# Patient Record
Sex: FEMALE | Race: WHITE | NOT HISPANIC OR LATINO | Employment: FULL TIME | URBAN - NONMETROPOLITAN AREA
[De-identification: names, ages, dates, MRNs, and addresses within clinical notes are randomized per-mention and may not be internally consistent; named-entity substitution may affect disease eponyms.]

---

## 2023-03-28 DIAGNOSIS — E87.6 HYPOKALEMIA: ICD-10-CM

## 2023-03-28 RX ORDER — POTASSIUM CHLORIDE 1500 MG/1
20 TABLET, EXTENDED RELEASE ORAL 2 TIMES DAILY
COMMUNITY
Start: 2022-02-11 | End: 2023-03-28 | Stop reason: SDUPTHER

## 2023-03-28 RX ORDER — POTASSIUM CHLORIDE 20 MEQ/1
20 TABLET, EXTENDED RELEASE ORAL 2 TIMES DAILY
Qty: 180 TABLET | Refills: 1 | Status: SHIPPED | OUTPATIENT
Start: 2023-03-28 | End: 2023-03-28 | Stop reason: SDUPTHER

## 2023-03-28 RX ORDER — POTASSIUM CHLORIDE 20 MEQ/1
20 TABLET, EXTENDED RELEASE ORAL 2 TIMES DAILY
Qty: 14 TABLET | Refills: 1 | Status: SHIPPED | OUTPATIENT
Start: 2023-03-28 | End: 2023-06-02 | Stop reason: SDUPTHER

## 2023-06-02 ENCOUNTER — OFFICE VISIT (OUTPATIENT)
Dept: PRIMARY CARE | Facility: CLINIC | Age: 43
End: 2023-06-02
Payer: COMMERCIAL

## 2023-06-02 VITALS
BODY MASS INDEX: 40.16 KG/M2 | DIASTOLIC BLOOD PRESSURE: 74 MMHG | WEIGHT: 265 LBS | HEIGHT: 68 IN | SYSTOLIC BLOOD PRESSURE: 136 MMHG | HEART RATE: 76 BPM

## 2023-06-02 DIAGNOSIS — R73.09 ELEVATED GLUCOSE: ICD-10-CM

## 2023-06-02 DIAGNOSIS — E78.49 OTHER HYPERLIPIDEMIA: ICD-10-CM

## 2023-06-02 DIAGNOSIS — E87.6 HYPOKALEMIA: ICD-10-CM

## 2023-06-02 DIAGNOSIS — E66.01 CLASS 3 SEVERE OBESITY DUE TO EXCESS CALORIES WITH SERIOUS COMORBIDITY AND BODY MASS INDEX (BMI) OF 40.0 TO 44.9 IN ADULT (MULTI): ICD-10-CM

## 2023-06-02 DIAGNOSIS — I10 PRIMARY HYPERTENSION: Primary | ICD-10-CM

## 2023-06-02 PROBLEM — G47.30 SLEEP APNEA: Status: ACTIVE | Noted: 2023-06-02

## 2023-06-02 PROBLEM — R21 MALAR RASH: Status: ACTIVE | Noted: 2023-06-02

## 2023-06-02 PROBLEM — I83.93 VARICOSE VEINS OF LEGS: Status: ACTIVE | Noted: 2023-06-02

## 2023-06-02 PROBLEM — G43.019 MIGRAINE WITHOUT AURA, INTRACTABLE: Status: ACTIVE | Noted: 2023-06-02

## 2023-06-02 PROCEDURE — 99214 OFFICE O/P EST MOD 30 MIN: CPT | Performed by: FAMILY MEDICINE

## 2023-06-02 PROCEDURE — 3008F BODY MASS INDEX DOCD: CPT | Performed by: FAMILY MEDICINE

## 2023-06-02 PROCEDURE — 1036F TOBACCO NON-USER: CPT | Performed by: FAMILY MEDICINE

## 2023-06-02 PROCEDURE — 3078F DIAST BP <80 MM HG: CPT | Performed by: FAMILY MEDICINE

## 2023-06-02 PROCEDURE — 3075F SYST BP GE 130 - 139MM HG: CPT | Performed by: FAMILY MEDICINE

## 2023-06-02 RX ORDER — POTASSIUM CHLORIDE 20 MEQ/1
20 TABLET, EXTENDED RELEASE ORAL DAILY
Qty: 14 TABLET | Refills: 1
Start: 2023-06-02 | End: 2024-01-08 | Stop reason: ALTCHOICE

## 2023-06-02 RX ORDER — CHOLECALCIFEROL (VITAMIN D3) 25 MCG
TABLET ORAL
COMMUNITY

## 2023-06-02 RX ORDER — ATORVASTATIN CALCIUM 40 MG/1
1 TABLET, FILM COATED ORAL NIGHTLY
COMMUNITY
Start: 2021-12-02 | End: 2023-06-19

## 2023-06-02 RX ORDER — COLISTIN SULFATE, NEOMYCIN SULFATE, THONZONIUM BROMIDE AND HYDROCORTISONE ACETATE 3; 3.3; .5; 1 MG/ML; MG/ML; MG/ML; MG/ML
3 SUSPENSION AURICULAR (OTIC) 4 TIMES DAILY
COMMUNITY
End: 2023-06-30 | Stop reason: ALTCHOICE

## 2023-06-02 RX ORDER — LOSARTAN POTASSIUM 100 MG/1
1 TABLET ORAL DAILY
COMMUNITY
Start: 2019-11-04 | End: 2023-06-19

## 2023-06-02 RX ORDER — CHLORTHALIDONE 25 MG/1
0.5 TABLET ORAL DAILY
COMMUNITY
Start: 2021-05-04 | End: 2023-06-02

## 2023-06-02 NOTE — PROGRESS NOTES
Patient presents for periodic surveillance of chronic medical problems.     Subjective  Katharine Lewis is a 42 y.o. female who presents for 6 month follow up (Wants to discuss getting off the chlorthalidone due to her side effects with the sun. ).  HPI  Htn, stable,   BMI 40, has been getting regular exercise    Hypokalemia, on diuretic and potassium supplement  Hyperlipidemia, elevated glucose, updating labs soon.    Since starting the chlorthalidone shes had issues in the sun, gets sick nauseous and the timing correlates almost exactly.  We talked about stopping it, fup in a month with labs prior.  Reduce potassium from twice daily to once daily and possibly discontinue in future.        Review of Systems   All other systems reviewed and are negative.  .    Objective     Visit Vitals  /74 (BP Location: Left arm, Patient Position: Sitting)   Pulse 76      Physical Exam  Vitals reviewed.   Constitutional:       Appearance: Normal appearance.   HENT:      Head: Normocephalic and atraumatic.   Cardiovascular:      Rate and Rhythm: Normal rate and regular rhythm.   Pulmonary:      Effort: Pulmonary effort is normal.      Breath sounds: Normal breath sounds.   Neurological:      Mental Status: She is alert.         Assessment/Plan   Problem List Items Addressed This Visit          Circulatory    Hypertension - Primary    Relevant Orders    Follow Up In Primary Care       Other    Other hyperlipidemia    Elevated glucose    Hypokalemia    Relevant Medications    potassium chloride CR (Klor-Con M20) 20 mEq ER tablet     Other Visit Diagnoses       Class 3 severe obesity due to excess calories with serious comorbidity and body mass index (BMI) of 40.0 to 44.9 in adult (CMS/[facility])                       Anabelle Mcgrath MD

## 2023-06-17 DIAGNOSIS — I10 PRIMARY HYPERTENSION: ICD-10-CM

## 2023-06-17 DIAGNOSIS — E78.49 OTHER HYPERLIPIDEMIA: ICD-10-CM

## 2023-06-19 RX ORDER — ATORVASTATIN CALCIUM 40 MG/1
TABLET, FILM COATED ORAL
Qty: 90 TABLET | Refills: 3 | Status: SHIPPED | OUTPATIENT
Start: 2023-06-19 | End: 2024-05-10

## 2023-06-19 RX ORDER — LOSARTAN POTASSIUM 100 MG/1
TABLET ORAL
Qty: 90 TABLET | Refills: 3 | Status: SHIPPED | OUTPATIENT
Start: 2023-06-19 | End: 2024-05-10

## 2023-06-23 ENCOUNTER — TELEPHONE (OUTPATIENT)
Dept: PRIMARY CARE | Facility: CLINIC | Age: 43
End: 2023-06-23
Payer: COMMERCIAL

## 2023-06-23 DIAGNOSIS — R82.90 ABNORMAL URINALYSIS: Primary | ICD-10-CM

## 2023-06-23 LAB
ALANINE AMINOTRANSFERASE (SGPT) (U/L) IN SER/PLAS: 23 U/L (ref 7–45)
ALBUMIN (G/DL) IN SER/PLAS: 3.9 G/DL (ref 3.4–5)
ALBUMIN (MG/L) IN URINE: 12.5 MG/L
ALBUMIN/CREATININE (UG/MG) IN URINE: 13 UG/MG CRT (ref 0–30)
ALKALINE PHOSPHATASE (U/L) IN SER/PLAS: 72 U/L (ref 33–110)
ANION GAP IN SER/PLAS: 11 MMOL/L (ref 10–20)
APPEARANCE, URINE: ABNORMAL
ASPARTATE AMINOTRANSFERASE (SGOT) (U/L) IN SER/PLAS: 17 U/L (ref 9–39)
BACTERIA, URINE: ABNORMAL /HPF
BASOPHILS (10*3/UL) IN BLOOD BY AUTOMATED COUNT: 0.05 X10E9/L (ref 0–0.1)
BASOPHILS/100 LEUKOCYTES IN BLOOD BY AUTOMATED COUNT: 0.7 % (ref 0–2)
BILIRUBIN TOTAL (MG/DL) IN SER/PLAS: 0.6 MG/DL (ref 0–1.2)
BILIRUBIN, URINE: NEGATIVE
BLOOD, URINE: ABNORMAL
CALCIUM (MG/DL) IN SER/PLAS: 8.9 MG/DL (ref 8.6–10.3)
CARBON DIOXIDE, TOTAL (MMOL/L) IN SER/PLAS: 23 MMOL/L (ref 21–32)
CHLORIDE (MMOL/L) IN SER/PLAS: 109 MMOL/L (ref 98–107)
CHOLESTEROL (MG/DL) IN SER/PLAS: 109 MG/DL (ref 0–199)
CHOLESTEROL IN HDL (MG/DL) IN SER/PLAS: 41 MG/DL
CHOLESTEROL/HDL RATIO: 2.7
COLOR, URINE: YELLOW
CREATININE (MG/DL) IN SER/PLAS: 0.85 MG/DL (ref 0.5–1.05)
CREATININE (MG/DL) IN URINE: 95.9 MG/DL (ref 20–320)
EOSINOPHILS (10*3/UL) IN BLOOD BY AUTOMATED COUNT: 0.16 X10E9/L (ref 0–0.7)
EOSINOPHILS/100 LEUKOCYTES IN BLOOD BY AUTOMATED COUNT: 2.2 % (ref 0–6)
ERYTHROCYTE DISTRIBUTION WIDTH (RATIO) BY AUTOMATED COUNT: 13 % (ref 11.5–14.5)
ERYTHROCYTE MEAN CORPUSCULAR HEMOGLOBIN CONCENTRATION (G/DL) BY AUTOMATED: 31.1 G/DL (ref 32–36)
ERYTHROCYTE MEAN CORPUSCULAR VOLUME (FL) BY AUTOMATED COUNT: 87 FL (ref 80–100)
ERYTHROCYTES (10*6/UL) IN BLOOD BY AUTOMATED COUNT: 4.43 X10E12/L (ref 4–5.2)
ESTIMATED AVERAGE GLUCOSE FOR HBA1C: 97 MG/DL
GFR FEMALE: 88 ML/MIN/1.73M2
GLUCOSE (MG/DL) IN SER/PLAS: 96 MG/DL (ref 74–99)
GLUCOSE, URINE: NEGATIVE MG/DL
HEMATOCRIT (%) IN BLOOD BY AUTOMATED COUNT: 38.6 % (ref 36–46)
HEMOGLOBIN (G/DL) IN BLOOD: 12 G/DL (ref 12–16)
HEMOGLOBIN A1C/HEMOGLOBIN TOTAL IN BLOOD: 5 %
IMMATURE GRANULOCYTES/100 LEUKOCYTES IN BLOOD BY AUTOMATED COUNT: 0.3 % (ref 0–0.9)
KETONES, URINE: NEGATIVE MG/DL
LDL: 51 MG/DL (ref 0–99)
LEUKOCYTE ESTERASE, URINE: ABNORMAL
LEUKOCYTES (10*3/UL) IN BLOOD BY AUTOMATED COUNT: 7.2 X10E9/L (ref 4.4–11.3)
LYMPHOCYTES (10*3/UL) IN BLOOD BY AUTOMATED COUNT: 2.11 X10E9/L (ref 1.2–4.8)
LYMPHOCYTES/100 LEUKOCYTES IN BLOOD BY AUTOMATED COUNT: 29.4 % (ref 13–44)
MONOCYTES (10*3/UL) IN BLOOD BY AUTOMATED COUNT: 0.66 X10E9/L (ref 0.1–1)
MONOCYTES/100 LEUKOCYTES IN BLOOD BY AUTOMATED COUNT: 9.2 % (ref 2–10)
NEUTROPHILS (10*3/UL) IN BLOOD BY AUTOMATED COUNT: 4.17 X10E9/L (ref 1.2–7.7)
NEUTROPHILS/100 LEUKOCYTES IN BLOOD BY AUTOMATED COUNT: 58.2 % (ref 40–80)
NITRITE, URINE: NEGATIVE
PH, URINE: 7 (ref 5–8)
PLATELETS (10*3/UL) IN BLOOD AUTOMATED COUNT: 295 X10E9/L (ref 150–450)
POTASSIUM (MMOL/L) IN SER/PLAS: 3.8 MMOL/L (ref 3.5–5.3)
PROTEIN TOTAL: 6.8 G/DL (ref 6.4–8.2)
PROTEIN, URINE: NEGATIVE MG/DL
RBC, URINE: 75 /HPF (ref 0–5)
SODIUM (MMOL/L) IN SER/PLAS: 139 MMOL/L (ref 136–145)
SPECIFIC GRAVITY, URINE: 1.01 (ref 1–1.03)
SQUAMOUS EPITHELIAL CELLS, URINE: 4 /HPF
THYROTROPIN (MIU/L) IN SER/PLAS BY DETECTION LIMIT <= 0.05 MIU/L: 2.97 MIU/L (ref 0.44–3.98)
TRIGLYCERIDE (MG/DL) IN SER/PLAS: 85 MG/DL (ref 0–149)
UREA NITROGEN (MG/DL) IN SER/PLAS: 15 MG/DL (ref 6–23)
UROBILINOGEN, URINE: <2 MG/DL (ref 0–1.9)
VLDL: 17 MG/DL (ref 0–40)
WBC, URINE: 12 /HPF (ref 0–5)

## 2023-06-23 NOTE — TELEPHONE ENCOUNTER
----- Message from Anabelle Mcgrath MD sent at 6/23/2023  9:36 AM EDT -----  Can you please ask lab to add a culture to the urine she did today, I will put the order in.

## 2023-06-24 LAB — URINE CULTURE: NO GROWTH

## 2023-06-30 ENCOUNTER — OFFICE VISIT (OUTPATIENT)
Dept: PRIMARY CARE | Facility: CLINIC | Age: 43
End: 2023-06-30
Payer: COMMERCIAL

## 2023-06-30 VITALS
HEART RATE: 76 BPM | BODY MASS INDEX: 39.71 KG/M2 | DIASTOLIC BLOOD PRESSURE: 64 MMHG | WEIGHT: 262 LBS | SYSTOLIC BLOOD PRESSURE: 118 MMHG | HEIGHT: 68 IN

## 2023-06-30 DIAGNOSIS — R31.1 BENIGN ESSENTIAL MICROSCOPIC HEMATURIA: ICD-10-CM

## 2023-06-30 DIAGNOSIS — E66.01 CLASS 2 SEVERE OBESITY DUE TO EXCESS CALORIES WITH SERIOUS COMORBIDITY AND BODY MASS INDEX (BMI) OF 39.0 TO 39.9 IN ADULT (MULTI): ICD-10-CM

## 2023-06-30 DIAGNOSIS — R31.21 ASYMPTOMATIC MICROSCOPIC HEMATURIA: Primary | ICD-10-CM

## 2023-06-30 DIAGNOSIS — R73.09 ELEVATED GLUCOSE: ICD-10-CM

## 2023-06-30 DIAGNOSIS — I10 PRIMARY HYPERTENSION: ICD-10-CM

## 2023-06-30 PROCEDURE — 3008F BODY MASS INDEX DOCD: CPT | Performed by: FAMILY MEDICINE

## 2023-06-30 PROCEDURE — 3078F DIAST BP <80 MM HG: CPT | Performed by: FAMILY MEDICINE

## 2023-06-30 PROCEDURE — 99214 OFFICE O/P EST MOD 30 MIN: CPT | Performed by: FAMILY MEDICINE

## 2023-06-30 PROCEDURE — 1036F TOBACCO NON-USER: CPT | Performed by: FAMILY MEDICINE

## 2023-06-30 PROCEDURE — 3074F SYST BP LT 130 MM HG: CPT | Performed by: FAMILY MEDICINE

## 2023-06-30 NOTE — PROGRESS NOTES
"     Subjective  Katharine Lewis is a 42 y.o. female who presents for Follow-up.  HPI  Htn, well controlled.  She was having some side effects from the sun with chlorthalidone, and we stopped that and cut back her potassium until followup labs.  She states about eight days after stopping she did not feel well, did some \"googling\" and started taking her potassium every other day.  Reviewed labs, potassium is fine, recommend we dc it altogether and recheck it when off the medication.  BP looks great.  Lipids, on statin  Elevated glucose , history of ,normal now with normal A1c  Reviewed labs, note hematuria, no symptoms, negative culture states was no on/near menses.  Recommend we get a renal US and repeat ua in few weeks.       Review of Systems   All other systems reviewed and are negative.  .    Objective     Visit Vitals  /64 (BP Location: Left arm, Patient Position: Sitting)   Pulse 76      Physical Exam  Vitals and nursing note reviewed.   Constitutional:       General: She is not in acute distress.     Appearance: Normal appearance. She is not toxic-appearing.   HENT:      Head: Normocephalic and atraumatic.   Cardiovascular:      Rate and Rhythm: Normal rate and regular rhythm.      Heart sounds: No murmur heard.  Pulmonary:      Effort: Pulmonary effort is normal.      Breath sounds: Normal breath sounds.   Musculoskeletal:      Comments:     Skin:     General: Skin is warm and dry.   Neurological:      General: No focal deficit present.      Mental Status: She is alert and oriented to person, place, and time.   Psychiatric:         Mood and Affect: Mood normal.         Behavior: Behavior normal.         Assessment/Plan   Problem List Items Addressed This Visit       Elevated glucose    Relevant Orders    Hemoglobin A1C    Hypertension    Relevant Orders    Follow Up In Primary Care - Established    CBC and Auto Differential    Comprehensive Metabolic Panel    Hemoglobin A1C     Other Visit Diagnoses  "      Asymptomatic microscopic hematuria    -  Primary    Relevant Orders    US renal complete    Class 2 severe obesity due to excess calories with serious comorbidity and body mass index (BMI) of 39.0 to 39.9 in adult (CMS/[facility])                       Anabelle Mcgrath MD

## 2023-06-30 NOTE — PATIENT INSTRUCTIONS
Routine check up in six months, labs prior .  Stop potassium.   Recheck potassium level with urinalysis in next month at your convenience.  Will schedule an ultrasound of your kidneys and call you with results as well.

## 2023-07-24 ENCOUNTER — LAB (OUTPATIENT)
Dept: LAB | Facility: LAB | Age: 43
End: 2023-07-24
Payer: COMMERCIAL

## 2023-07-24 DIAGNOSIS — I10 PRIMARY HYPERTENSION: ICD-10-CM

## 2023-07-24 DIAGNOSIS — R31.1 BENIGN ESSENTIAL MICROSCOPIC HEMATURIA: ICD-10-CM

## 2023-07-24 LAB
BACTERIA, URINE: ABNORMAL /HPF
POTASSIUM (MMOL/L) IN SER/PLAS: 4 MMOL/L (ref 3.5–5.3)
RBC, URINE: 1 /HPF (ref 0–5)
SQUAMOUS EPITHELIAL CELLS, URINE: 1 /HPF
WBC, URINE: 3 /HPF (ref 0–5)

## 2023-07-24 PROCEDURE — 84132 ASSAY OF SERUM POTASSIUM: CPT

## 2023-07-24 PROCEDURE — 36415 COLL VENOUS BLD VENIPUNCTURE: CPT

## 2023-07-24 PROCEDURE — 81001 URINALYSIS AUTO W/SCOPE: CPT

## 2023-12-29 ENCOUNTER — APPOINTMENT (OUTPATIENT)
Dept: PRIMARY CARE | Facility: CLINIC | Age: 43
End: 2023-12-29
Payer: COMMERCIAL

## 2024-01-05 ENCOUNTER — LAB (OUTPATIENT)
Dept: LAB | Facility: LAB | Age: 44
End: 2024-01-05
Payer: COMMERCIAL

## 2024-01-05 DIAGNOSIS — I10 PRIMARY HYPERTENSION: ICD-10-CM

## 2024-01-05 DIAGNOSIS — R73.09 ELEVATED GLUCOSE: ICD-10-CM

## 2024-01-05 LAB
ALBUMIN SERPL BCP-MCNC: 3.8 G/DL (ref 3.4–5)
ALP SERPL-CCNC: 72 U/L (ref 33–110)
ALT SERPL W P-5'-P-CCNC: 19 U/L (ref 7–45)
ANION GAP SERPL CALC-SCNC: 10 MMOL/L (ref 10–20)
AST SERPL W P-5'-P-CCNC: 18 U/L (ref 9–39)
BASOPHILS # BLD AUTO: 0.04 X10*3/UL (ref 0–0.1)
BASOPHILS NFR BLD AUTO: 0.6 %
BILIRUB SERPL-MCNC: 0.7 MG/DL (ref 0–1.2)
BUN SERPL-MCNC: 18 MG/DL (ref 6–23)
CALCIUM SERPL-MCNC: 8.7 MG/DL (ref 8.6–10.3)
CHLORIDE SERPL-SCNC: 108 MMOL/L (ref 98–107)
CO2 SERPL-SCNC: 24 MMOL/L (ref 21–32)
CREAT SERPL-MCNC: 0.96 MG/DL (ref 0.5–1.05)
EOSINOPHIL # BLD AUTO: 0.14 X10*3/UL (ref 0–0.7)
EOSINOPHIL NFR BLD AUTO: 2.2 %
ERYTHROCYTE [DISTWIDTH] IN BLOOD BY AUTOMATED COUNT: 14.3 % (ref 11.5–14.5)
EST. AVERAGE GLUCOSE BLD GHB EST-MCNC: 94 MG/DL
GFR SERPL CREATININE-BSD FRML MDRD: 76 ML/MIN/1.73M*2
GLUCOSE SERPL-MCNC: 96 MG/DL (ref 74–99)
HBA1C MFR BLD: 4.9 %
HCT VFR BLD AUTO: 38.7 % (ref 36–46)
HGB BLD-MCNC: 12 G/DL (ref 12–16)
IMM GRANULOCYTES # BLD AUTO: 0.02 X10*3/UL (ref 0–0.7)
IMM GRANULOCYTES NFR BLD AUTO: 0.3 % (ref 0–0.9)
LYMPHOCYTES # BLD AUTO: 1.98 X10*3/UL (ref 1.2–4.8)
LYMPHOCYTES NFR BLD AUTO: 30.9 %
MCH RBC QN AUTO: 26.5 PG (ref 26–34)
MCHC RBC AUTO-ENTMCNC: 31 G/DL (ref 32–36)
MCV RBC AUTO: 86 FL (ref 80–100)
MONOCYTES # BLD AUTO: 0.57 X10*3/UL (ref 0.1–1)
MONOCYTES NFR BLD AUTO: 8.9 %
NEUTROPHILS # BLD AUTO: 3.65 X10*3/UL (ref 1.2–7.7)
NEUTROPHILS NFR BLD AUTO: 57.1 %
NRBC BLD-RTO: 0 /100 WBCS (ref 0–0)
PLATELET # BLD AUTO: 269 X10*3/UL (ref 150–450)
POTASSIUM SERPL-SCNC: 4 MMOL/L (ref 3.5–5.3)
PROT SERPL-MCNC: 6.7 G/DL (ref 6.4–8.2)
RBC # BLD AUTO: 4.52 X10*6/UL (ref 4–5.2)
SODIUM SERPL-SCNC: 138 MMOL/L (ref 136–145)
WBC # BLD AUTO: 6.4 X10*3/UL (ref 4.4–11.3)

## 2024-01-05 PROCEDURE — 85025 COMPLETE CBC W/AUTO DIFF WBC: CPT

## 2024-01-05 PROCEDURE — 80053 COMPREHEN METABOLIC PANEL: CPT

## 2024-01-05 PROCEDURE — 83036 HEMOGLOBIN GLYCOSYLATED A1C: CPT

## 2024-01-05 PROCEDURE — 36415 COLL VENOUS BLD VENIPUNCTURE: CPT

## 2024-01-08 ENCOUNTER — OFFICE VISIT (OUTPATIENT)
Dept: PRIMARY CARE | Facility: CLINIC | Age: 44
End: 2024-01-08
Payer: COMMERCIAL

## 2024-01-08 VITALS
SYSTOLIC BLOOD PRESSURE: 126 MMHG | HEART RATE: 70 BPM | WEIGHT: 254 LBS | BODY MASS INDEX: 38.62 KG/M2 | OXYGEN SATURATION: 99 % | DIASTOLIC BLOOD PRESSURE: 70 MMHG

## 2024-01-08 DIAGNOSIS — Z12.31 SCREENING MAMMOGRAM FOR BREAST CANCER: Primary | ICD-10-CM

## 2024-01-08 DIAGNOSIS — I10 PRIMARY HYPERTENSION: ICD-10-CM

## 2024-01-08 DIAGNOSIS — E78.49 OTHER HYPERLIPIDEMIA: ICD-10-CM

## 2024-01-08 DIAGNOSIS — Z00.00 HEALTH CARE MAINTENANCE: ICD-10-CM

## 2024-01-08 DIAGNOSIS — R73.09 ELEVATED GLUCOSE: ICD-10-CM

## 2024-01-08 PROBLEM — R21 MALAR RASH: Status: RESOLVED | Noted: 2023-06-02 | Resolved: 2024-01-08

## 2024-01-08 PROBLEM — E87.6 HYPOKALEMIA: Status: RESOLVED | Noted: 2023-06-02 | Resolved: 2024-01-08

## 2024-01-08 PROCEDURE — 99214 OFFICE O/P EST MOD 30 MIN: CPT | Performed by: FAMILY MEDICINE

## 2024-01-08 PROCEDURE — 3078F DIAST BP <80 MM HG: CPT | Performed by: FAMILY MEDICINE

## 2024-01-08 PROCEDURE — 3074F SYST BP LT 130 MM HG: CPT | Performed by: FAMILY MEDICINE

## 2024-01-08 PROCEDURE — 3008F BODY MASS INDEX DOCD: CPT | Performed by: FAMILY MEDICINE

## 2024-01-08 PROCEDURE — 1036F TOBACCO NON-USER: CPT | Performed by: FAMILY MEDICINE

## 2024-01-08 RX ORDER — AMOXICILLIN AND CLAVULANATE POTASSIUM 500; 125 MG/1; MG/1
500 TABLET, FILM COATED ORAL 2 TIMES DAILY
COMMUNITY
Start: 2024-01-04 | End: 2024-02-02 | Stop reason: ALTCHOICE

## 2024-01-08 ASSESSMENT — PATIENT HEALTH QUESTIONNAIRE - PHQ9
2. FEELING DOWN, DEPRESSED OR HOPELESS: NOT AT ALL
SUM OF ALL RESPONSES TO PHQ9 QUESTIONS 1 AND 2: 0
1. LITTLE INTEREST OR PLEASURE IN DOING THINGS: NOT AT ALL

## 2024-01-08 NOTE — PATIENT INSTRUCTIONS
Routine followup six months, labs prior.  GYN appt.  Consider 12 mos fup if all well in six months.  As weight comes down, may be able to cut back on medication, call concerns.

## 2024-01-08 NOTE — PROGRESS NOTES
Patient presents for periodic surveillance of chronic medical problems.     Subjective  Katharine Lewis is a 43 y.o. female who presents for Follow-up (6 MO FUV).  HPI  Htn,stable  Has been to gym regularly for last six weeks, has , eating well.  States feels great.  Had two episodes after chest pain felt like bp was high.  Hyperlipidemia on statin  Elevated glucose history, A1c normal at 4.9  Due for mammogram.  GYN care due.  States past year with menses has been passing large clots.    Review of Systems   All other systems reviewed and are negative.  .    Objective     Visit Vitals  /70   Pulse 70      Physical Exam  Vitals and nursing note reviewed.   Constitutional:       General: She is not in acute distress.     Appearance: Normal appearance. She is not toxic-appearing.   HENT:      Head: Normocephalic and atraumatic.   Cardiovascular:      Rate and Rhythm: Normal rate and regular rhythm.      Heart sounds: No murmur heard.  Pulmonary:      Effort: Pulmonary effort is normal.      Breath sounds: Normal breath sounds.   Musculoskeletal:      Cervical back: Neck supple. No rigidity.      Comments:     Skin:     General: Skin is warm and dry.   Neurological:      General: No focal deficit present.      Mental Status: She is alert and oriented to person, place, and time.   Psychiatric:         Mood and Affect: Mood normal.         Behavior: Behavior normal.         Assessment/Plan   Problem List Items Addressed This Visit       Other hyperlipidemia    Relevant Orders    Lipid Panel    TSH with reflex to Free T4 if abnormal    Elevated glucose    Hypertension    Relevant Orders    Comprehensive Metabolic Panel    Lipid Panel    TSH with reflex to Free T4 if abnormal     Other Visit Diagnoses       Screening mammogram for breast cancer    -  Primary    Relevant Orders    BI mammo bilateral screening tomosynthesis    Health care maintenance        Relevant Orders    Referral to Gynecology                    Anabelle Mcgrath MD

## 2024-01-15 ENCOUNTER — HOSPITAL ENCOUNTER (OUTPATIENT)
Dept: RADIOLOGY | Facility: HOSPITAL | Age: 44
Discharge: HOME | End: 2024-01-15
Payer: COMMERCIAL

## 2024-01-15 DIAGNOSIS — Z12.31 SCREENING MAMMOGRAM FOR BREAST CANCER: ICD-10-CM

## 2024-01-15 PROCEDURE — 77067 SCR MAMMO BI INCL CAD: CPT | Performed by: RADIOLOGY

## 2024-01-15 PROCEDURE — 77063 BREAST TOMOSYNTHESIS BI: CPT | Performed by: RADIOLOGY

## 2024-01-15 PROCEDURE — 77067 SCR MAMMO BI INCL CAD: CPT

## 2024-02-02 ENCOUNTER — OFFICE VISIT (OUTPATIENT)
Dept: OBSTETRICS AND GYNECOLOGY | Facility: CLINIC | Age: 44
End: 2024-02-02
Payer: COMMERCIAL

## 2024-02-02 VITALS
DIASTOLIC BLOOD PRESSURE: 72 MMHG | WEIGHT: 251 LBS | BODY MASS INDEX: 38.04 KG/M2 | HEIGHT: 68 IN | SYSTOLIC BLOOD PRESSURE: 124 MMHG

## 2024-02-02 DIAGNOSIS — Z01.419 ENCOUNTER FOR GYNECOLOGICAL EXAMINATION WITHOUT ABNORMAL FINDING: ICD-10-CM

## 2024-02-02 DIAGNOSIS — Z00.00 HEALTH CARE MAINTENANCE: ICD-10-CM

## 2024-02-02 DIAGNOSIS — Z12.4 ENCOUNTER FOR SCREENING FOR CERVICAL CANCER: ICD-10-CM

## 2024-02-02 DIAGNOSIS — N92.0 MENORRHAGIA WITH REGULAR CYCLE: Primary | ICD-10-CM

## 2024-02-02 PROCEDURE — 1036F TOBACCO NON-USER: CPT | Performed by: OBSTETRICS & GYNECOLOGY

## 2024-02-02 PROCEDURE — 3078F DIAST BP <80 MM HG: CPT | Performed by: OBSTETRICS & GYNECOLOGY

## 2024-02-02 PROCEDURE — 99386 PREV VISIT NEW AGE 40-64: CPT | Performed by: OBSTETRICS & GYNECOLOGY

## 2024-02-02 PROCEDURE — 3008F BODY MASS INDEX DOCD: CPT | Performed by: OBSTETRICS & GYNECOLOGY

## 2024-02-02 PROCEDURE — 3074F SYST BP LT 130 MM HG: CPT | Performed by: OBSTETRICS & GYNECOLOGY

## 2024-02-02 PROCEDURE — 88175 CYTOPATH C/V AUTO FLUID REDO: CPT

## 2024-02-02 RX ORDER — NORETHINDRONE ACETATE AND ETHINYL ESTRADIOL 1MG-20(21)
1 KIT ORAL DAILY
Qty: 28 TABLET | Refills: 12 | Status: SHIPPED | OUTPATIENT
Start: 2024-02-02 | End: 2024-03-25 | Stop reason: ALTCHOICE

## 2024-02-02 NOTE — PROGRESS NOTES
Katharine Lewis is a 43 y.o. female who is here for a routine exam. PCP = Anabelle Mcgrath MD    Chief Complaint   Patient presents with    New Patient Visit     New Patient is here for yearly exam and pap test. Patient does do regular self breast exams and c/o very heavy periods with very large clots. LMP:24.          Presents for annual exam. She voices no complaints and is doing well. Denies any bowel or bladder problems. Denies any breast problems.  She had previous tubal sterilization.  Over the last year she has noticed that her menstrual flows have become heavy with clots.  She has regular monthly cycles without intermenstrual bleeding.    OB History          2    Para   2    Term   2       0    AB   0    Living   2         SAB   0    IAB   0    Ectopic   0    Multiple   0    Live Births   2                  Social History     Substance and Sexual Activity   Sexual Activity Yes    Partners: Male    Birth control/protection: Female Sterilization     Current contraception: Tubal Sterilization    Past Medical History:   Diagnosis Date    Anemia, unspecified 2021    Mild anemia    Essential (primary) hypertension 2022    Hypertension    Migraine without aura, intractable, without status migrainosus 2021    Migraine without aura, intractable    Personal history of other endocrine, nutritional and metabolic disease 2021    History of vitamin D deficiency       Past Surgical History:   Procedure Laterality Date    TUBAL LIGATION         Past med hx and past surg hx reviewed and notable for: none    Review of Systems:   Constitutional: No fever or chills  Respiratory: No shortness of breath, or cough  Cardiovascular: No chest pain or syncope  Breasts: No breast pain, no masses, no nipple discharge  Gastrointestinal: No nausea, vomiting, or diarrhea, no abdominal pain  Genitourinary: No dysuria or frequency  Gynecology: Negative except as noted in history of present  "illness  All other: All other systems reviewed and negative for complaint    Objective   /72   Ht 1.727 m (5' 8\")   Wt 114 kg (251 lb)   LMP 01/04/2024   BMI 38.16 kg/m²     PHYSICAL EXAMINATION:  Well-developed, well nourished, in no acute distress, alert and oriented x three, is pleasant and cooperative.   HEENT: Clear. Pupils equal, round and reactive to light and accommodation. Extraocular muscles are intact. Oral mucosa pink without exudate.   NECK: No lymphadenopathy, no thyromegaly.  BREASTS: Symmetric, no palpable masses. No nipple discharge or retraction.  LUNGS: Clear bilaterally.  HEART: Regular rate and rhythm without murmurs.  ABDOMEN: Normoactive bowel sounds, soft and nontender, no guarding or rebound tenderness, no CVA tenderness.  EXTREMITIES: No clubbing, cyanosis or edema.  NEUROLOGIC:  Cranial nerves II-XII grossly intact.  :  Normal external female genitalia, normal vulva, normal vagina. Normal urethral meatus, urethra and bladder. Normal appearing cervix. Normal-sized uterus, no adnexal masses or tenderness. Pap smear performed today.      Actions performed during this visit include:  - Clinical breast exam  - Clinical pelvic exam  - No orders of the defined types were placed in this encounter.       Problem List Items Addressed This Visit    None  Visit Diagnoses       Menorrhagia with regular cycle    -  Primary    Relevant Medications    norethindrone-e.estradioL-iron (Loestrin Fe 1/20, 28-Day,) 1 mg-20 mcg (21)/75 mg (7) tablet    Health care maintenance        Relevant Orders    THINPREP PAP TEST    Encounter for gynecological examination without abnormal finding        Relevant Orders    THINPREP PAP TEST    Encounter for screening for cervical cancer                 Provider Impression:  1.  Annual  2.  Menorrhagia  Patient had recent mammogram performed.  Regarding her heavy menstrual flows she was informed of the options of birth control pills, NuvaRing, IUD or endometrial " ablation.  Patient wishes to try birth control pills.      Thank you for coming to your annual exam. Your findings during the exam were normal.  Please return for your next visit in 4 months to assess how she is doing on the pills..

## 2024-02-16 LAB
CYTOLOGY CMNT CVX/VAG CYTO-IMP: NORMAL
LAB AP HPV GENOTYPE QUESTION: YES
LAB AP HPV HR: NORMAL
LABORATORY COMMENT REPORT: NORMAL
LMP START DATE: NORMAL
PATH REPORT.TOTAL CANCER: NORMAL

## 2024-03-25 ENCOUNTER — OFFICE VISIT (OUTPATIENT)
Dept: PRIMARY CARE | Facility: CLINIC | Age: 44
End: 2024-03-25
Payer: COMMERCIAL

## 2024-03-25 VITALS
HEIGHT: 68 IN | DIASTOLIC BLOOD PRESSURE: 86 MMHG | BODY MASS INDEX: 37.59 KG/M2 | SYSTOLIC BLOOD PRESSURE: 144 MMHG | WEIGHT: 248 LBS | HEART RATE: 68 BPM

## 2024-03-25 DIAGNOSIS — R09.81 NASAL CONGESTION: Primary | ICD-10-CM

## 2024-03-25 PROCEDURE — 3077F SYST BP >= 140 MM HG: CPT | Performed by: FAMILY MEDICINE

## 2024-03-25 PROCEDURE — 3008F BODY MASS INDEX DOCD: CPT | Performed by: FAMILY MEDICINE

## 2024-03-25 PROCEDURE — 99213 OFFICE O/P EST LOW 20 MIN: CPT | Performed by: FAMILY MEDICINE

## 2024-03-25 PROCEDURE — 3079F DIAST BP 80-89 MM HG: CPT | Performed by: FAMILY MEDICINE

## 2024-03-25 PROCEDURE — 1036F TOBACCO NON-USER: CPT | Performed by: FAMILY MEDICINE

## 2024-03-25 RX ORDER — AMOXICILLIN 875 MG/1
875 TABLET, FILM COATED ORAL 2 TIMES DAILY
Qty: 20 TABLET | Refills: 0 | Status: SHIPPED | OUTPATIENT
Start: 2024-03-25 | End: 2024-04-04

## 2024-03-25 RX ORDER — MONTELUKAST SODIUM 10 MG/1
10 TABLET ORAL NIGHTLY
Qty: 30 TABLET | Refills: 5 | Status: SHIPPED | OUTPATIENT
Start: 2024-03-25 | End: 2024-09-21

## 2024-03-25 RX ORDER — LORATADINE 10 MG/1
10 TABLET ORAL DAILY
COMMUNITY

## 2024-03-25 NOTE — PATIENT INSTRUCTIONS
Likely allergic, recommend regular flonase and claritin daily, add singulair, if not helping try antibiotic, call concerns.

## 2024-03-25 NOTE — PROGRESS NOTES
Subjective  Katharine Lewis is a 43 y.o. female who presents for Head congestion.  HPI  Taking claritin, having nasal congestion, postnasal drainage, throat itches.  Cough occasionally.  Ears feel full.    NO sneezing, eyes don't itch or water.  No facial pain, no fevers/chills.    ON flonase, not using regularly.  When using it it helps, short term.  Today feels ill,achy.  Review of Systems   All other systems reviewed and are negative.  .    Objective     Visit Vitals  /86 (BP Location: Left arm, Patient Position: Sitting)   Pulse 68      Physical Exam  Vitals and nursing note reviewed.   Constitutional:       General: She is not in acute distress.     Appearance: Normal appearance. She is not toxic-appearing.   HENT:      Head: Normocephalic and atraumatic.      Right Ear: Tympanic membrane normal.      Left Ear: Tympanic membrane normal.      Mouth/Throat:      Mouth: Mucous membranes are moist.      Pharynx: No posterior oropharyngeal erythema.   Cardiovascular:      Rate and Rhythm: Normal rate and regular rhythm.      Heart sounds: No murmur heard.  Pulmonary:      Effort: Pulmonary effort is normal.      Breath sounds: Normal breath sounds.   Musculoskeletal:      Cervical back: Neck supple. No rigidity.      Comments: Normal gait   Skin:     General: Skin is warm and dry.   Neurological:      General: No focal deficit present.      Mental Status: She is alert and oriented to person, place, and time.   Psychiatric:         Mood and Affect: Mood normal.         Behavior: Behavior normal.         Assessment/Plan   Problem List Items Addressed This Visit    None  Visit Diagnoses       Nasal congestion    -  Primary    Relevant Medications    montelukast (Singulair) 10 mg tablet    amoxicillin (Amoxil) 875 mg tablet                   Anabelle Mcgrath MD

## 2024-04-08 ENCOUNTER — OFFICE VISIT (OUTPATIENT)
Dept: PRIMARY CARE | Facility: CLINIC | Age: 44
End: 2024-04-08
Payer: COMMERCIAL

## 2024-04-08 VITALS
DIASTOLIC BLOOD PRESSURE: 78 MMHG | HEIGHT: 68 IN | SYSTOLIC BLOOD PRESSURE: 122 MMHG | OXYGEN SATURATION: 98 % | HEART RATE: 77 BPM | BODY MASS INDEX: 37.28 KG/M2 | WEIGHT: 246 LBS

## 2024-04-08 DIAGNOSIS — R09.81 NASAL SINUS CONGESTION: Primary | ICD-10-CM

## 2024-04-08 PROCEDURE — 3078F DIAST BP <80 MM HG: CPT | Performed by: FAMILY MEDICINE

## 2024-04-08 PROCEDURE — 1036F TOBACCO NON-USER: CPT | Performed by: FAMILY MEDICINE

## 2024-04-08 PROCEDURE — 3074F SYST BP LT 130 MM HG: CPT | Performed by: FAMILY MEDICINE

## 2024-04-08 PROCEDURE — 3008F BODY MASS INDEX DOCD: CPT | Performed by: FAMILY MEDICINE

## 2024-04-08 PROCEDURE — 99213 OFFICE O/P EST LOW 20 MIN: CPT | Performed by: FAMILY MEDICINE

## 2024-04-08 RX ORDER — PREDNISONE 10 MG/1
TABLET ORAL
Qty: 30 TABLET | Refills: 0 | Status: SHIPPED | OUTPATIENT
Start: 2024-04-08

## 2024-04-08 RX ORDER — PREDNISONE 10 MG/1
TABLET ORAL
Qty: 30 TABLET | Refills: 0 | Status: SHIPPED | OUTPATIENT
Start: 2024-04-08 | End: 2024-04-08 | Stop reason: SDUPTHER

## 2024-04-08 RX ORDER — AMOXICILLIN AND CLAVULANATE POTASSIUM 875; 125 MG/1; MG/1
875 TABLET, FILM COATED ORAL 2 TIMES DAILY
Qty: 20 TABLET | Refills: 0 | Status: SHIPPED | OUTPATIENT
Start: 2024-04-08 | End: 2024-04-08 | Stop reason: SDUPTHER

## 2024-04-08 RX ORDER — AMOXICILLIN AND CLAVULANATE POTASSIUM 875; 125 MG/1; MG/1
875 TABLET, FILM COATED ORAL 2 TIMES DAILY
Qty: 20 TABLET | Refills: 0 | Status: SHIPPED | OUTPATIENT
Start: 2024-04-08 | End: 2024-04-18

## 2024-04-08 RX ORDER — FLUTICASONE PROPIONATE 50 MCG
1 SPRAY, SUSPENSION (ML) NASAL DAILY
COMMUNITY

## 2024-04-08 NOTE — PROGRESS NOTES
Subjective  Katharine Lewis is a 43 y.o. female who presents for Follow-up.  HPI  Using claritin flonase and singulair.  Took the amoxil, thinks she did feel better with it.   Whole head feels pressure, no fevers.  Appetite okay.     Review of Systems   All other systems reviewed and are negative.  .    Objective     Visit Vitals  /78   Pulse 77      Physical Exam  Vitals reviewed.   Constitutional:       General: She is not in acute distress.     Appearance: She is not ill-appearing.   HENT:      Head: Normocephalic.      Right Ear: Tympanic membrane normal.      Left Ear: Tympanic membrane normal.      Ears:      Comments: cerumen right canal     Mouth/Throat:      Pharynx: No oropharyngeal exudate or posterior oropharyngeal erythema.      Comments: Clear drainage with cobblestoning  Eyes:      Conjunctiva/sclera: Conjunctivae normal.   Cardiovascular:      Rate and Rhythm: Normal rate.   Pulmonary:      Effort: Pulmonary effort is normal.      Breath sounds: Normal breath sounds.   Neurological:      Mental Status: She is alert.         Assessment/Plan   Problem List Items Addressed This Visit    None  Visit Diagnoses       Nasal sinus congestion    -  Primary    Relevant Medications    amoxicillin-pot clavulanate (Augmentin) 875-125 mg tablet    predniSONE (Deltasone) 10 mg tablet             Continue antihistamine, flonase and singulair.  Add prednisone taper, side effects discussed.   Do not feel this is likely bacterial, if not improving can proceed with trial of augmentin.   Call concerns.      Anabelle Mcgrath MD

## 2024-05-09 DIAGNOSIS — I10 PRIMARY HYPERTENSION: ICD-10-CM

## 2024-05-09 DIAGNOSIS — E78.49 OTHER HYPERLIPIDEMIA: ICD-10-CM

## 2024-05-10 RX ORDER — ATORVASTATIN CALCIUM 40 MG/1
TABLET, FILM COATED ORAL
Qty: 90 TABLET | Refills: 3 | Status: SHIPPED | OUTPATIENT
Start: 2024-05-10

## 2024-05-10 RX ORDER — LOSARTAN POTASSIUM 100 MG/1
TABLET ORAL
Qty: 90 TABLET | Refills: 3 | Status: SHIPPED | OUTPATIENT
Start: 2024-05-10

## 2024-06-17 ENCOUNTER — OFFICE VISIT (OUTPATIENT)
Dept: PRIMARY CARE | Facility: CLINIC | Age: 44
End: 2024-06-17
Payer: COMMERCIAL

## 2024-06-17 ENCOUNTER — LAB (OUTPATIENT)
Dept: LAB | Facility: LAB | Age: 44
End: 2024-06-17
Payer: COMMERCIAL

## 2024-06-17 ENCOUNTER — HOSPITAL ENCOUNTER (OUTPATIENT)
Dept: CARDIOLOGY | Facility: HOSPITAL | Age: 44
Discharge: HOME | End: 2024-06-17
Payer: COMMERCIAL

## 2024-06-17 VITALS
WEIGHT: 243.2 LBS | HEART RATE: 62 BPM | BODY MASS INDEX: 36.98 KG/M2 | SYSTOLIC BLOOD PRESSURE: 130 MMHG | DIASTOLIC BLOOD PRESSURE: 86 MMHG | OXYGEN SATURATION: 98 %

## 2024-06-17 DIAGNOSIS — R53.83 OTHER FATIGUE: Primary | ICD-10-CM

## 2024-06-17 DIAGNOSIS — E78.49 OTHER HYPERLIPIDEMIA: ICD-10-CM

## 2024-06-17 DIAGNOSIS — R53.1 WEAKNESS: ICD-10-CM

## 2024-06-17 DIAGNOSIS — R53.83 OTHER FATIGUE: ICD-10-CM

## 2024-06-17 DIAGNOSIS — I10 PRIMARY HYPERTENSION: ICD-10-CM

## 2024-06-17 LAB
ALBUMIN SERPL BCP-MCNC: 4.1 G/DL (ref 3.4–5)
ALP SERPL-CCNC: 89 U/L (ref 33–110)
ALT SERPL W P-5'-P-CCNC: 18 U/L (ref 7–45)
ANION GAP SERPL CALC-SCNC: 13 MMOL/L (ref 10–20)
AST SERPL W P-5'-P-CCNC: 17 U/L (ref 9–39)
BASOPHILS # BLD AUTO: 0.04 X10*3/UL (ref 0–0.1)
BASOPHILS NFR BLD AUTO: 0.6 %
BILIRUB SERPL-MCNC: 0.5 MG/DL (ref 0–1.2)
BUN SERPL-MCNC: 18 MG/DL (ref 6–23)
CALCIUM SERPL-MCNC: 9.1 MG/DL (ref 8.6–10.3)
CHLORIDE SERPL-SCNC: 105 MMOL/L (ref 98–107)
CHOLEST SERPL-MCNC: 207 MG/DL (ref 0–199)
CHOLESTEROL/HDL RATIO: 4.2
CO2 SERPL-SCNC: 27 MMOL/L (ref 21–32)
CREAT SERPL-MCNC: 0.71 MG/DL (ref 0.5–1.05)
EGFRCR SERPLBLD CKD-EPI 2021: >90 ML/MIN/1.73M*2
EOSINOPHIL # BLD AUTO: 0.15 X10*3/UL (ref 0–0.7)
EOSINOPHIL NFR BLD AUTO: 2.1 %
ERYTHROCYTE [DISTWIDTH] IN BLOOD BY AUTOMATED COUNT: 15.3 % (ref 11.5–14.5)
GLUCOSE SERPL-MCNC: 82 MG/DL (ref 74–99)
HCT VFR BLD AUTO: 38.5 % (ref 36–46)
HDLC SERPL-MCNC: 49 MG/DL
HGB BLD-MCNC: 11.9 G/DL (ref 12–16)
IMM GRANULOCYTES # BLD AUTO: 0.02 X10*3/UL (ref 0–0.7)
IMM GRANULOCYTES NFR BLD AUTO: 0.3 % (ref 0–0.9)
LDLC SERPL CALC-MCNC: 124 MG/DL
LYMPHOCYTES # BLD AUTO: 1.96 X10*3/UL (ref 1.2–4.8)
LYMPHOCYTES NFR BLD AUTO: 27.9 %
MCH RBC QN AUTO: 25.8 PG (ref 26–34)
MCHC RBC AUTO-ENTMCNC: 30.9 G/DL (ref 32–36)
MCV RBC AUTO: 84 FL (ref 80–100)
MONOCYTES # BLD AUTO: 0.61 X10*3/UL (ref 0.1–1)
MONOCYTES NFR BLD AUTO: 8.7 %
NEUTROPHILS # BLD AUTO: 4.24 X10*3/UL (ref 1.2–7.7)
NEUTROPHILS NFR BLD AUTO: 60.4 %
NON HDL CHOLESTEROL: 158 MG/DL (ref 0–149)
NRBC BLD-RTO: 0 /100 WBCS (ref 0–0)
PLATELET # BLD AUTO: 319 X10*3/UL (ref 150–450)
POTASSIUM SERPL-SCNC: 4.5 MMOL/L (ref 3.5–5.3)
PROT SERPL-MCNC: 6.8 G/DL (ref 6.4–8.2)
RBC # BLD AUTO: 4.61 X10*6/UL (ref 4–5.2)
SODIUM SERPL-SCNC: 140 MMOL/L (ref 136–145)
TRIGL SERPL-MCNC: 169 MG/DL (ref 0–149)
TSH SERPL-ACNC: 2.51 MIU/L (ref 0.44–3.98)
VIT B12 SERPL-MCNC: 381 PG/ML (ref 211–911)
VLDL: 34 MG/DL (ref 0–40)
WBC # BLD AUTO: 7 X10*3/UL (ref 4.4–11.3)

## 2024-06-17 PROCEDURE — 93010 ELECTROCARDIOGRAM REPORT: CPT | Performed by: STUDENT IN AN ORGANIZED HEALTH CARE EDUCATION/TRAINING PROGRAM

## 2024-06-17 PROCEDURE — 80053 COMPREHEN METABOLIC PANEL: CPT

## 2024-06-17 PROCEDURE — 85025 COMPLETE CBC W/AUTO DIFF WBC: CPT

## 2024-06-17 PROCEDURE — 86038 ANTINUCLEAR ANTIBODIES: CPT

## 2024-06-17 PROCEDURE — 84443 ASSAY THYROID STIM HORMONE: CPT

## 2024-06-17 PROCEDURE — 3079F DIAST BP 80-89 MM HG: CPT | Performed by: FAMILY MEDICINE

## 2024-06-17 PROCEDURE — 36415 COLL VENOUS BLD VENIPUNCTURE: CPT

## 2024-06-17 PROCEDURE — 3008F BODY MASS INDEX DOCD: CPT | Performed by: FAMILY MEDICINE

## 2024-06-17 PROCEDURE — 93005 ELECTROCARDIOGRAM TRACING: CPT

## 2024-06-17 PROCEDURE — 1036F TOBACCO NON-USER: CPT | Performed by: FAMILY MEDICINE

## 2024-06-17 PROCEDURE — 82533 TOTAL CORTISOL: CPT

## 2024-06-17 PROCEDURE — 82607 VITAMIN B-12: CPT

## 2024-06-17 PROCEDURE — 99214 OFFICE O/P EST MOD 30 MIN: CPT | Performed by: FAMILY MEDICINE

## 2024-06-17 PROCEDURE — 80061 LIPID PANEL: CPT

## 2024-06-17 PROCEDURE — 3075F SYST BP GE 130 - 139MM HG: CPT | Performed by: FAMILY MEDICINE

## 2024-06-17 ASSESSMENT — ENCOUNTER SYMPTOMS
DIZZINESS: 1
FATIGUE: 1

## 2024-06-17 NOTE — PROGRESS NOTES
Subjective  Katharine Lewis is a 43 y.o. female who presents for Fatigue and Dizziness (This is the start of week 3-extra vitamins didn't help).  Fatigue  Associated symptoms include fatigue.   Dizziness  Associated symptoms include fatigue.     Pt presents with fatigue, state could sleep 24 hours a day and not feel well rested.  States feels foggy, taking a shower takes every ounce of energy she has and she could go back to bed.  Denies feeling sad or down.  Describes head as eeling cloudy.  States its not dizziness.  States she feels like her head is on a stick and is floating around.  Feels well rested in the morning, and then after her shower she has used all her energy.  Sometimes feels like legs are anxious, no pain in joints or muscles.   No tick bites.   Snores. Has sleep apnea, declined cpap and uses an inclind pillow.   Has lost 30 pounds with lifestyle changes.   Sometimes feels like she is going to pass out.  States walking around the grocery store yesterday she thought she was going to faint.   Averages 7 52 hours sleep per night. [place]el, previous sleep study in 2021.  Has not been checking bp, will start doing so.      Current Outpatient Medications:     atorvastatin (Lipitor) 40 mg tablet, TAKE 1 TABLET BY MOUTH AT  BEDTIME, Disp: 90 tablet, Rfl: 3    cholecalciferol (Vitamin D-3) 25 MCG (1000 UT) tablet, Take by mouth., Disp: , Rfl:     fluticasone (Flonase) 50 mcg/actuation nasal spray, Administer 1 spray into each nostril once daily. Shake gently. Before first use, prime pump. After use, clean tip and replace cap., Disp: , Rfl:     loratadine (Claritin) 10 mg tablet, Take 1 tablet (10 mg) by mouth once daily., Disp: , Rfl:     losartan (Cozaar) 100 mg tablet, TAKE 1 TABLET BY MOUTH DAILY, Disp: 90 tablet, Rfl: 3    montelukast (Singulair) 10 mg tablet, Take 1 tablet (10 mg) by mouth once daily at bedtime., Disp: 30 tablet, Rfl: 5    predniSONE (Deltasone) 10 mg tablet, 4 tabs daily for 3  days, 3 tabs daily for 3 days, 2 tabs daily for 3 days, 1 tab daily for 3 days, then discontinue, Disp: 30 tablet, Rfl: 0     Patient Active Problem List   Diagnosis    Other hyperlipidemia    Elevated glucose    Hypertension    Migraine without aura, intractable    Sleep apnea    Varicose veins of legs      Review of Systems   Constitutional:  Positive for fatigue.   Neurological:  Positive for dizziness.   All other systems reviewed and are negative.  .    Objective     Visit Vitals  /86   Pulse 62      Physical Exam  Vitals reviewed.   HENT:      Head: Normocephalic.   Cardiovascular:      Rate and Rhythm: Normal rate and regular rhythm.   Pulmonary:      Effort: Pulmonary effort is normal.      Breath sounds: Normal breath sounds.   Skin:     Coloration: Skin is not pale.   Neurological:      General: No focal deficit present.      Mental Status: She is alert.   Psychiatric:         Mood and Affect: Mood normal.         Assessment/Plan   Problem List Items Addressed This Visit    None  Visit Diagnoses       Other fatigue    -  Primary    Relevant Orders    CBC and Auto Differential    Comprehensive Metabolic Panel    TSH with reflex to Free T4 if abnormal    Vitamin B12    Weakness        Relevant Orders    ECG 12 lead (Ancillary Performed)             Followup in a few weeks, call concerns.       Anabelle Mcgrath MD

## 2024-06-18 LAB
ATRIAL RATE: 53 BPM
CORTIS SERPL-MCNC: 11.9 UG/DL (ref 2.5–20)
P AXIS: 48 DEGREES
P OFFSET: 194 MS
P ONSET: 142 MS
PR INTERVAL: 146 MS
Q ONSET: 215 MS
QRS COUNT: 8 BEATS
QRS DURATION: 86 MS
QT INTERVAL: 458 MS
QTC CALCULATION(BAZETT): 429 MS
QTC FREDERICIA: 439 MS
R AXIS: 34 DEGREES
T AXIS: 36 DEGREES
T OFFSET: 444 MS
VENTRICULAR RATE: 53 BPM

## 2024-06-19 LAB
ANA PATTERN: ABNORMAL
ANA SER QL HEP2 SUBST: POSITIVE
ANA TITR SER IF: ABNORMAL {TITER}

## 2024-06-20 ENCOUNTER — PATIENT MESSAGE (OUTPATIENT)
Dept: PRIMARY CARE | Facility: CLINIC | Age: 44
End: 2024-06-20
Payer: COMMERCIAL

## 2024-06-20 DIAGNOSIS — R53.83 OTHER FATIGUE: ICD-10-CM

## 2024-06-20 DIAGNOSIS — R21 MALAR RASH: ICD-10-CM

## 2024-06-20 DIAGNOSIS — R76.8 POSITIVE ANA (ANTINUCLEAR ANTIBODY): ICD-10-CM

## 2024-06-20 DIAGNOSIS — G47.33 OBSTRUCTIVE SLEEP APNEA SYNDROME: Primary | ICD-10-CM

## 2024-07-01 ENCOUNTER — APPOINTMENT (OUTPATIENT)
Dept: PRIMARY CARE | Facility: CLINIC | Age: 44
End: 2024-07-01
Payer: COMMERCIAL

## 2024-07-01 VITALS
SYSTOLIC BLOOD PRESSURE: 138 MMHG | BODY MASS INDEX: 37.31 KG/M2 | WEIGHT: 245.4 LBS | HEART RATE: 67 BPM | DIASTOLIC BLOOD PRESSURE: 80 MMHG | OXYGEN SATURATION: 99 %

## 2024-07-01 DIAGNOSIS — R00.1 BRADYCARDIA: ICD-10-CM

## 2024-07-01 DIAGNOSIS — I10 PRIMARY HYPERTENSION: ICD-10-CM

## 2024-07-01 DIAGNOSIS — R53.83 OTHER FATIGUE: Primary | ICD-10-CM

## 2024-07-01 PROCEDURE — 3079F DIAST BP 80-89 MM HG: CPT | Performed by: FAMILY MEDICINE

## 2024-07-01 PROCEDURE — 3075F SYST BP GE 130 - 139MM HG: CPT | Performed by: FAMILY MEDICINE

## 2024-07-01 PROCEDURE — 99214 OFFICE O/P EST MOD 30 MIN: CPT | Performed by: FAMILY MEDICINE

## 2024-07-01 PROCEDURE — 1036F TOBACCO NON-USER: CPT | Performed by: FAMILY MEDICINE

## 2024-07-01 ASSESSMENT — ENCOUNTER SYMPTOMS: DIZZINESS: 1

## 2024-07-01 NOTE — PROGRESS NOTES
"     Subjective  Katharine Lewis is a 43 y.o. female who presents for Follow-up and Dizziness (Fatigue- symptoms not getting better).  Dizziness  Pertinent negatives include no chest pain.     Here for follow up.  Sleep study scheduled.  Rheum not scheduled yet.  See previous note.  Still feeling very tired.  Feels wiped out.  Feels foggy.  Was carrying buckets , and when she put them down she had to sit down, did not have symptoms while carrying.   Legs got shaky.  Sometimes short of breath with exertion but not consistently, that is new.    Started feeling worse Thursday after Memorial Day.  Feels sad, mainly because she's worried about what's going on.    Appetite fine.  Sometimes headache. Sometimes feels like a bobble head.  Feels like a strong to her neck, shoulders and ears that winds itself tight, and \"bobble head ,cloudiness \"starts.   That lasts until she sleeps for a few hours. There are three -4 hours a day before she feels extreme fatigue.  Wakes up okay, weak and fatigued, but can focus.  Some days it comes on fast and she has to lie down.  Better with sleep and can get up and feels better for awhile.    States daughter broke her tibia and has to have surgery.   Reviewed home htn log.  Review of Systems   Cardiovascular:  Negative for chest pain.   Neurological:  Positive for dizziness.   All other systems reviewed and are negative.  .    Objective     Visit Vitals  /80   Pulse 67      Physical Exam  Vitals reviewed.   HENT:      Head: Normocephalic.   Eyes:      General: No scleral icterus.  Pulmonary:      Effort: Pulmonary effort is normal.   Skin:     Coloration: Skin is not pale.   Neurological:      General: No focal deficit present.      Mental Status: She is alert.   Psychiatric:         Attention and Perception: Attention normal.         Mood and Affect: Affect is tearful.         Thought Content: Thought content normal.         Assessment/Plan   Problem List Items Addressed This Visit "       Hypertension     Other Visit Diagnoses       Other fatigue    -  Primary    Relevant Orders    Transthoracic Echo (TTE) Complete    Holter or Event Cardiac Monitor    Bradycardia        Relevant Orders    Holter or Event Cardiac Monitor               Sleep study, rheum pending.  Follow up in a few weeks, call concerns. Etiology not yet determined.      Anabelle Mcgrath MD

## 2024-07-08 ENCOUNTER — APPOINTMENT (OUTPATIENT)
Dept: SLEEP MEDICINE | Facility: CLINIC | Age: 44
End: 2024-07-08
Payer: COMMERCIAL

## 2024-07-08 ENCOUNTER — APPOINTMENT (OUTPATIENT)
Dept: PRIMARY CARE | Facility: CLINIC | Age: 44
End: 2024-07-08
Payer: COMMERCIAL

## 2024-07-08 ENCOUNTER — CLINICAL SUPPORT (OUTPATIENT)
Dept: SLEEP MEDICINE | Facility: CLINIC | Age: 44
End: 2024-07-08
Payer: COMMERCIAL

## 2024-07-08 VITALS
DIASTOLIC BLOOD PRESSURE: 97 MMHG | HEIGHT: 68 IN | SYSTOLIC BLOOD PRESSURE: 183 MMHG | WEIGHT: 244.71 LBS | BODY MASS INDEX: 37.09 KG/M2 | TEMPERATURE: 98 F

## 2024-07-08 DIAGNOSIS — G47.33 OBSTRUCTIVE SLEEP APNEA SYNDROME: ICD-10-CM

## 2024-07-08 ASSESSMENT — SLEEP AND FATIGUE QUESTIONNAIRES
HOW LIKELY ARE YOU TO NOD OFF OR FALL ASLEEP WHILE SITTING AND READING: MODERATE CHANCE OF DOZING
HOW LIKELY ARE YOU TO NOD OFF OR FALL ASLEEP IN A CAR, WHILE STOPPED FOR A FEW MINUTES IN TRAFFIC: WOULD NEVER DOZE
HOW LIKELY ARE YOU TO NOD OFF OR FALL ASLEEP WHILE SITTING AND TALKING TO SOMEONE: WOULD NEVER DOZE
SITING INACTIVE IN A PUBLIC PLACE LIKE A CLASS ROOM OR A MOVIE THEATER: WOULD NEVER DOZE
HOW LIKELY ARE YOU TO NOD OFF OR FALL ASLEEP WHILE LYING DOWN TO REST IN THE AFTERNOON WHEN CIRCUMSTANCES PERMIT: SLIGHT CHANCE OF DOZING
HOW LIKELY ARE YOU TO NOD OFF OR FALL ASLEEP WHILE SITTING QUIETLY AFTER LUNCH WITHOUT ALCOHOL: WOULD NEVER DOZE
HOW LIKELY ARE YOU TO NOD OFF OR FALL ASLEEP WHEN YOU ARE A PASSENGER IN A CAR FOR AN HOUR WITHOUT A BREAK: SLIGHT CHANCE OF DOZING
HOW LIKELY ARE YOU TO NOD OFF OR FALL ASLEEP WHILE WATCHING TV: SLIGHT CHANCE OF DOZING
ESS-CHAD TOTAL SCORE: 5

## 2024-07-09 NOTE — PROGRESS NOTES
[facility] TECH NOTE:     Patient: Katharine Lewis   MRN//AGE: [id number]  1980  43 y.o.   Technologist: PHYLLIS Pedro   Room: 4   Service Date: 2024        Sleep Testing Location: [place] [address]     [place]: 5    TECHNOLOGIST SLEEP STUDY PROCEDURE NOTE:   This sleep study is being conducted according to the policies and procedures outlined by the AAS accreditation standards.  The sleep study procedure and processes involved during this appointment was explained to the patient and all questions were answered. The patient verbalized understanding.      The patient is a 43 y.o. year old female scheduled for a Polysomnogram  with montage of:  PSG MASTER      The study that was ultimately completed was a  PSG MASTER   with montage of:  PSG MASTER  .    The full study Was completed.  Patient questionnaires completed?: yes     Consents signed? yes    Initial Fall Risk Screening:     Katharine has not fallen in the last 6 months.  Katharine does not have a fear of falling. She does not need assistance with sitting, standing, or walking. she does not need assistance walking in her home. she does not need assistance in an unfamiliar setting. The patient is notusing an assistive device.     Brief Study observations: Room 4. Scheduled SPLIT study. Criteria for split was not met. No snoring noted. REM related hypopneas observed. Leg movements were noted throughout most of the study. Occasional PVCs noted.     Deviation to order/protocol and reason: Criteria for split night study was not met.       After the procedure, the patient was informed to ensure followup with ordering clinician for testing results.      Technologist: PHYLLIS Pedro

## 2024-07-11 ENCOUNTER — PATIENT MESSAGE (OUTPATIENT)
Dept: PRIMARY CARE | Facility: CLINIC | Age: 44
End: 2024-07-11
Payer: COMMERCIAL

## 2024-07-11 DIAGNOSIS — R53.83 OTHER FATIGUE: Primary | ICD-10-CM

## 2024-07-15 ENCOUNTER — APPOINTMENT (OUTPATIENT)
Dept: SLEEP MEDICINE | Facility: CLINIC | Age: 44
End: 2024-07-15
Payer: COMMERCIAL

## 2024-07-17 ENCOUNTER — LAB (OUTPATIENT)
Dept: LAB | Facility: LAB | Age: 44
End: 2024-07-17
Payer: COMMERCIAL

## 2024-07-17 DIAGNOSIS — R53.83 OTHER FATIGUE: ICD-10-CM

## 2024-07-17 PROCEDURE — 36415 COLL VENOUS BLD VENIPUNCTURE: CPT

## 2024-07-17 PROCEDURE — 87476 LYME DIS DNA AMP PROBE: CPT

## 2024-07-18 ENCOUNTER — HOSPITAL ENCOUNTER (OUTPATIENT)
Dept: CARDIOLOGY | Facility: HOSPITAL | Age: 44
Discharge: HOME | End: 2024-07-18
Payer: COMMERCIAL

## 2024-07-18 DIAGNOSIS — R55 SYNCOPE AND COLLAPSE: ICD-10-CM

## 2024-07-18 DIAGNOSIS — R53.83 OTHER FATIGUE: ICD-10-CM

## 2024-07-18 DIAGNOSIS — R00.1 BRADYCARDIA: ICD-10-CM

## 2024-07-18 LAB
AORTIC VALVE MEAN GRADIENT: 5 MMHG
AORTIC VALVE PEAK VELOCITY: 1.51 M/S
AV PEAK GRADIENT: 9.1 MMHG
AVA (PEAK VEL): 2.87 CM2
AVA (VTI): 2.7 CM2
EJECTION FRACTION APICAL 4 CHAMBER: 57.5
EJECTION FRACTION: 59 %
LEFT VENTRICLE INTERNAL DIMENSION DIASTOLE: 4.7 CM (ref 3.5–6)
LEFT VENTRICULAR OUTFLOW TRACT DIAMETER: 1.9 CM
LV EJECTION FRACTION BIPLANE: 59 %
MITRAL VALVE E/A RATIO: 1.42
RIGHT VENTRICLE FREE WALL PEAK S': 11.2 CM/S
RIGHT VENTRICLE PEAK SYSTOLIC PRESSURE: 22.7 MMHG
TRICUSPID ANNULAR PLANE SYSTOLIC EXCURSION: 2.5 CM

## 2024-07-18 PROCEDURE — 93270 REMOTE 30 DAY ECG REV/REPORT: CPT

## 2024-07-18 PROCEDURE — 9420000001 HC RT PATIENT EDUCATION 5 MIN

## 2024-07-18 PROCEDURE — 93306 TTE W/DOPPLER COMPLETE: CPT

## 2024-07-18 PROCEDURE — 93306 TTE W/DOPPLER COMPLETE: CPT | Performed by: STUDENT IN AN ORGANIZED HEALTH CARE EDUCATION/TRAINING PROGRAM

## 2024-07-20 LAB
B BURGDOR DNA SPEC QL NAA+PROBE: NOT DETECTED
SPECIMEN SOURCE: NORMAL

## 2024-07-22 ENCOUNTER — APPOINTMENT (OUTPATIENT)
Dept: PRIMARY CARE | Facility: CLINIC | Age: 44
End: 2024-07-22
Payer: COMMERCIAL

## 2024-07-22 VITALS
WEIGHT: 246 LBS | DIASTOLIC BLOOD PRESSURE: 82 MMHG | SYSTOLIC BLOOD PRESSURE: 134 MMHG | HEART RATE: 72 BPM | HEIGHT: 67 IN | BODY MASS INDEX: 38.61 KG/M2

## 2024-07-22 DIAGNOSIS — R45.89 FEELING SAD: ICD-10-CM

## 2024-07-22 DIAGNOSIS — M54.9 UPPER BACK PAIN, CHRONIC: ICD-10-CM

## 2024-07-22 DIAGNOSIS — M54.2 NECK PAIN: ICD-10-CM

## 2024-07-22 DIAGNOSIS — G44.86 CERVICOGENIC HEADACHE: ICD-10-CM

## 2024-07-22 DIAGNOSIS — G89.29 UPPER BACK PAIN, CHRONIC: ICD-10-CM

## 2024-07-22 DIAGNOSIS — R53.83 OTHER FATIGUE: Primary | ICD-10-CM

## 2024-07-22 PROCEDURE — 3008F BODY MASS INDEX DOCD: CPT | Performed by: FAMILY MEDICINE

## 2024-07-22 PROCEDURE — 3079F DIAST BP 80-89 MM HG: CPT | Performed by: FAMILY MEDICINE

## 2024-07-22 PROCEDURE — 1036F TOBACCO NON-USER: CPT | Performed by: FAMILY MEDICINE

## 2024-07-22 PROCEDURE — 99214 OFFICE O/P EST MOD 30 MIN: CPT | Performed by: FAMILY MEDICINE

## 2024-07-22 PROCEDURE — 3075F SYST BP GE 130 - 139MM HG: CPT | Performed by: FAMILY MEDICINE

## 2024-07-22 RX ORDER — ESCITALOPRAM OXALATE 10 MG/1
10 TABLET ORAL DAILY
Qty: 30 TABLET | Refills: 2 | Status: SHIPPED | OUTPATIENT
Start: 2024-07-22 | End: 2025-01-18

## 2024-07-22 NOTE — PROGRESS NOTES
"Patient presents for periodic surveillance of chronic medical problems.     Subjective  Katharine Lewis is a 43 y.o. female who presents for Follow-up.  HPISymptoms continue, about the same.  Morning feels like she can function okay, can think about what she needs to get done for today.  But by noon she has difficulty focusing, headache and tension in upper back neck.  Headache is all over.   Has been to PT for neck in past, and does those stretches.     Sleep study okay.  Echo okay, wearing heart monitor.  Can exert herself, but when done she feels lightheaded.  Example fine to carry in something from the garden, but when done her symptoms start.    Able to work \" sort of\", states work in the mornings and then early afternoon she has difficulty concentrating.     Once she gets the headache taking a hot shower helps, lying down helps.  Feels like there is a fishing line between her shoulder blades, and it is wound tight and her head is filled with gas. Sees massage therapy and feels better when she's tehre and has some energy for the evening.    Review of Systems   All other systems reviewed and are negative.  .    Objective     Visit Vitals  /82 (BP Location: Left arm, Patient Position: Sitting)   Pulse 72      Physical Exam  Vitals reviewed.   HENT:      Head: Normocephalic.   Pulmonary:      Effort: Pulmonary effort is normal.   Skin:     Coloration: Skin is not pale.   Neurological:      General: No focal deficit present.      Mental Status: She is alert.   Psychiatric:         Mood and Affect: Affect is not flat or tearful.       Hospital Outpatient Visit on 07/18/2024   Component Date Value Ref Range Status    LVOT diam 07/18/2024 1.90  cm Final    MV E/A ratio 07/18/2024 1.42   Final    AV pk luis alfredo 07/18/2024 1.51  m/s Final    AV mn grad 07/18/2024 5.0  mmHg Final    LV Biplane EF 07/18/2024 59  % Final    Tricuspid annular plane systolic e* 07/18/2024 2.5  cm Final    LV EF 07/18/2024 59  % Final    " RV free wall pk S' 07/18/2024 11.20  cm/s Final    LVIDd 07/18/2024 4.70  cm Final    RVSP 07/18/2024 22.7  mmHg Final    Aortic Valve Area by Continuity of* 07/18/2024 2.87  cm2 Final    Aortic Valve Area by Continuity of* 07/18/2024 2.70  cm2 Final    AV pk grad 07/18/2024 9.1  mmHg Final    LV A4C EF 07/18/2024 57.5   Final   Lab on 07/17/2024   Component Date Value Ref Range Status    Lyme Disease (Borrelia burgdorferi* 07/17/2024 Not Detected   Final    NOT DETECTED - A negative result does not rule out the   presence of PCR inhibitors in the patient specimen or   assay specific nucleic acid in concentrations below the   level of detection by the assay.     Blood and CSF specimens have poor clinical sensitivity for   detection of Borrelia burgdorferi by PCR.  INTERPRETIVE INFORMATION: Borrelia Species DNA Detection by PCR    This test was developed and its performance characteristics   determined by [facility]. It has not been cleared or   approved by the US Food and Drug Administration. This test was   performed in a CLIA certified laboratory and is intended for   clinical purposes.  Performed By: [facility]  [address] [place] [id number]  : Lavon To MD, PhD  CLIA Number: [id number]    Lyme Source 07/17/2024 Blood   Final   Hospital Outpatient Visit on 06/17/2024   Component Date Value Ref Range Status    Ventricular Rate 06/17/2024 53  BPM Final    Atrial Rate 06/17/2024 53  BPM Final    NH Interval 06/17/2024 146  ms Final    QRS Duration 06/17/2024 86  ms Final    QT Interval 06/17/2024 458  ms Final    QTC Calculation(Bazett) 06/17/2024 429  ms Final    P Axis 06/17/2024 48  degrees Final    R Axis 06/17/2024 34  degrees Final    T Axis 06/17/2024 36  degrees Final    QRS Count 06/17/2024 8  beats Final    Q Onset 06/17/2024 215  ms Final    P Onset 06/17/2024 142  ms Final    P Offset 06/17/2024 194  ms Final    T Offset 06/17/2024 444  ms Final     QTC Fredericia 06/17/2024 439  ms Final   Lab on 06/17/2024   Component Date Value Ref Range Status    Glucose 06/17/2024 82  74 - 99 mg/dL Final    Sodium 06/17/2024 140  136 - 145 mmol/L Final    Potassium 06/17/2024 4.5  3.5 - 5.3 mmol/L Final    Chloride 06/17/2024 105  98 - 107 mmol/L Final    Bicarbonate 06/17/2024 27  21 - 32 mmol/L Final    Anion Gap 06/17/2024 13  10 - 20 mmol/L Final    Urea Nitrogen 06/17/2024 18  6 - 23 mg/dL Final    Creatinine 06/17/2024 0.71  0.50 - 1.05 mg/dL Final    eGFR 06/17/2024 >90  >60 mL/min/1.73m*2 Final    Calculations of estimated GFR are performed using the 2021 CKD-EPI Study Refit equation without the race variable for the IDMS-Traceable creatinine methods.  https://jasn.asnjournals.org/content/early/2021/09/22/ASN.0019052731    Calcium 06/17/2024 9.1  8.6 - 10.3 mg/dL Final    Albumin 06/17/2024 4.1  3.4 - 5.0 g/dL Final    Alkaline Phosphatase 06/17/2024 89  33 - 110 U/L Final    Total Protein 06/17/2024 6.8  6.4 - 8.2 g/dL Final    AST 06/17/2024 17  9 - 39 U/L Final    Bilirubin, Total 06/17/2024 0.5  0.0 - 1.2 mg/dL Final    ALT 06/17/2024 18  7 - 45 U/L Final    Patients treated with Sulfasalazine may generate falsely decreased results for ALT.    Cholesterol 06/17/2024 207 (H)  0 - 199 mg/dL Final          Age      Desirable   Borderline High   High     0-19 Y     0 - 169       170 - 199     >/= 200    20-24 Y     0 - 189       190 - 224     >/= 225         >24 Y     0 - 199       200 - 239     >/= 240   **All ranges are based on fasting samples. Specific   therapeutic targets will vary based on patient-specific   cardiac risk.    Pediatric guidelines reference:Pediatrics 2011, 128(S5).Adult guidelines reference: NCEP ATPIII Guidelines,CAMRYN 2001, 258:2486-97    Venipuncture immediately after or during the administration of Metamizole may lead to falsely low results. Testing should be performed immediately prior to Metamizole dosing.    HDL-Cholesterol 06/17/2024  49.0  mg/dL Final      Age       Very Low   Low     Normal    High    0-19 Y    < 35      < 40     40-45     ----  20-24 Y    ----     < 40      >45      ----        >24 Y      ----     < 40     40-60      >60      Cholesterol/HDL Ratio 06/17/2024 4.2   Final      Ref Values  Desirable  < 3.4  High Risk  > 5.0    LDL Calculated 06/17/2024 124 (H)  <=99 mg/dL Final                                Near   Borderline      AGE      Desirable  Optimal    High     High     Very High     0-19 Y     0 - 109     ---    110-129   >/= 130     ----    20-24 Y     0 - 119     ---    120-159   >/= 160     ----      >24 Y     0 -  99   100-129  130-159   160-189     >/=190      VLDL 06/17/2024 34  0 - 40 mg/dL Final    Triglycerides 06/17/2024 169 (H)  0 - 149 mg/dL Final       Age         Desirable   Borderline High   High     Very High   0 D-90 D    19 - 174         ----         ----        ----  91 D- 9 Y     0 -  74        75 -  99     >/= 100      ----    10-19 Y     0 -  89        90 - 129     >/= 130      ----    20-24 Y     0 - 114       115 - 149     >/= 150      ----         >24 Y     0 - 149       150 - 199    200- 499    >/= 500    Venipuncture immediately after or during the administration of Metamizole may lead to falsely low results. Testing should be performed immediately prior to Metamizole dosing.    Non HDL Cholesterol 06/17/2024 158 (H)  0 - 149 mg/dL Final          Age       Desirable   Borderline High   High     Very High     0-19 Y     0 - 119       120 - 144     >/= 145    >/= 160    20-24 Y     0 - 149       150 - 189     >/= 190      ----         >24 Y    30 mg/dL above LDL Cholesterol goal      Thyroid Stimulating Hormone 06/17/2024 2.51  0.44 - 3.98 mIU/L Final    WBC 06/17/2024 7.0  4.4 - 11.3 x10*3/uL Final    nRBC 06/17/2024 0.0  0.0 - 0.0 /100 WBCs Final    RBC 06/17/2024 4.61  4.00 - 5.20 x10*6/uL Final    Hemoglobin 06/17/2024 11.9 (L)  12.0 - 16.0 g/dL Final    Hematocrit 06/17/2024 38.5  36.0 -  46.0 % Final    MCV 06/17/2024 84  80 - 100 fL Final    MCH 06/17/2024 25.8 (L)  26.0 - 34.0 pg Final    MCHC 06/17/2024 30.9 (L)  32.0 - 36.0 g/dL Final    RDW 06/17/2024 15.3 (H)  11.5 - 14.5 % Final    Platelets 06/17/2024 319  150 - 450 x10*3/uL Final    Neutrophils % 06/17/2024 60.4  40.0 - 80.0 % Final    Immature Granulocytes %, Automated 06/17/2024 0.3  0.0 - 0.9 % Final    Immature Granulocyte Count (IG) includes promyelocytes, myelocytes and metamyelocytes but does not include bands. Percent differential counts (%) should be interpreted in the context of the absolute cell counts (cells/UL).    Lymphocytes % 06/17/2024 27.9  13.0 - 44.0 % Final    Monocytes % 06/17/2024 8.7  2.0 - 10.0 % Final    Eosinophils % 06/17/2024 2.1  0.0 - 6.0 % Final    Basophils % 06/17/2024 0.6  0.0 - 2.0 % Final    Neutrophils Absolute 06/17/2024 4.24  1.20 - 7.70 x10*3/uL Final    Percent differential counts (%) should be interpreted in the context of the absolute cell counts (cells/uL).    Immature Granulocytes Absolute, Au* 06/17/2024 0.02  0.00 - 0.70 x10*3/uL Final    Lymphocytes Absolute 06/17/2024 1.96  1.20 - 4.80 x10*3/uL Final    Monocytes Absolute 06/17/2024 0.61  0.10 - 1.00 x10*3/uL Final    Eosinophils Absolute 06/17/2024 0.15  0.00 - 0.70 x10*3/uL Final    Basophils Absolute 06/17/2024 0.04  0.00 - 0.10 x10*3/uL Final    Vitamin B12 06/17/2024 381  211 - 911 pg/mL Final    Cortisol 06/17/2024 11.9  2.5 - 20.0 ug/dL Final    VAISHNAVI 06/17/2024 Positive (A)  Negative Final    The Antinuclear Antibody (VAISHNAVI) test was performed using  indirect immunofluorescence assay with HEp-2 cells slide.    VAISHNAVI Pattern 06/17/2024 Homogeneous   Final    VAISHNAVI Titer 06/17/2024 1:160   Final      Assessment/Plan   Problem List Items Addressed This Visit    None  Visit Diagnoses       Other fatigue    -  Primary    Relevant Medications    escitalopram (Lexapro) 10 mg tablet    Other Relevant Orders    Cortisol (Completed)    VAISHNAVI (Completed)     Cervicogenic headache        Relevant Orders    CT head w and wo IV contrast    Feeling sad        Relevant Medications    escitalopram (Lexapro) 10 mg tablet    Upper back pain, chronic        Neck pain        Relevant Orders    Referral to Physical Therapy             Trial  of PT and SSRI, continue rheum, complete holter.  Followup in a few weeks.  Side effects of medication reviewed, call concerns.       Anabelle Mcgrath MD

## 2024-07-25 ENCOUNTER — APPOINTMENT (OUTPATIENT)
Dept: PHYSICAL THERAPY | Facility: CLINIC | Age: 44
End: 2024-07-25
Payer: COMMERCIAL

## 2024-07-31 ENCOUNTER — TELEPHONE (OUTPATIENT)
Dept: PRIMARY CARE | Facility: CLINIC | Age: 44
End: 2024-07-31
Payer: COMMERCIAL

## 2024-07-31 DIAGNOSIS — I10 PRIMARY HYPERTENSION: Primary | ICD-10-CM

## 2024-07-31 NOTE — TELEPHONE ENCOUNTER
CT called and said the pt needs labs done prior to her CT on 8/5/24. States need a GFR and creatinine if JOJ will please order, thanks.

## 2024-08-01 ENCOUNTER — EVALUATION (OUTPATIENT)
Dept: PHYSICAL THERAPY | Facility: CLINIC | Age: 44
End: 2024-08-01
Payer: COMMERCIAL

## 2024-08-01 ENCOUNTER — LAB (OUTPATIENT)
Dept: LAB | Facility: LAB | Age: 44
End: 2024-08-01
Payer: COMMERCIAL

## 2024-08-01 DIAGNOSIS — M54.2 NECK PAIN: ICD-10-CM

## 2024-08-01 DIAGNOSIS — G44.229 CHRONIC TENSION HEADACHE: Primary | ICD-10-CM

## 2024-08-01 DIAGNOSIS — I10 PRIMARY HYPERTENSION: ICD-10-CM

## 2024-08-01 DIAGNOSIS — M43.6 NECK RIGID: ICD-10-CM

## 2024-08-01 LAB
BUN SERPL-MCNC: 17 MG/DL (ref 6–23)
CREAT SERPL-MCNC: 1.16 MG/DL (ref 0.5–1.05)
EGFRCR SERPLBLD CKD-EPI 2021: 61 ML/MIN/1.73M*2

## 2024-08-01 PROCEDURE — 97161 PT EVAL LOW COMPLEX 20 MIN: CPT | Mod: GP

## 2024-08-01 PROCEDURE — 36415 COLL VENOUS BLD VENIPUNCTURE: CPT

## 2024-08-01 PROCEDURE — 82565 ASSAY OF CREATININE: CPT

## 2024-08-01 PROCEDURE — 97110 THERAPEUTIC EXERCISES: CPT | Mod: GP

## 2024-08-01 PROCEDURE — 84520 ASSAY OF UREA NITROGEN: CPT

## 2024-08-01 PROCEDURE — 97140 MANUAL THERAPY 1/> REGIONS: CPT | Mod: GP

## 2024-08-01 ASSESSMENT — PAIN SCALES - GENERAL: PAINLEVEL_OUTOF10: 6

## 2024-08-01 ASSESSMENT — PAIN - FUNCTIONAL ASSESSMENT: PAIN_FUNCTIONAL_ASSESSMENT: 0-10

## 2024-08-01 NOTE — PROGRESS NOTES
Physical Therapy    Physical Therapy Evaluation    Patient Name: Katharine Lewis  MRN: [id number]  : 1980  Referring Physician: Anabelle Mcgrath  Today's Date: 2024  Time Calculation  Start Time: 932  Stop Time:   Time Calculation (min): 52 min  PT Evaluation Time Entry  PT Evaluation (Low) Time Entry: 28  PT Therapeutic Procedures Time Entry  Manual Therapy Time Entry: 14  Therapeutic Exercise Time Entry: 10           General  Reason for Referral: neck pain  Referred By: Anabelle Mcgrath  General Comment: Visit  POC    Current Problem  Problem List Items Addressed This Visit             ICD-10-CM       Musculoskeletal and Injuries    Neck pain M54.2    Relevant Orders    Follow Up In Physical Therapy    Neck rigid M43.6    Relevant Orders    Follow Up In Physical Therapy       Neuro    Chronic tension headache - Primary G44.229    Relevant Orders    Follow Up In Physical Therapy          SUBJECTIVE  Subjective   Patient reports neck pain, tightness, and headaches that began in  . This is leading to difficulty with turning head, work duties, driving, and quality of life.  Pain is reported to range 6-8/10 with daily activities.  Patient states that their goal is to reduce headaches with physical therapy intervention.    Prior level of function: WNL    Patient's name and  were confirmed this date.    Pt has had neck pain for years but had most exacerbation in . Pt has been having lightheadedness, dizziness, headaches, trouble focusing. Pt went to doctor who said she had tight neck musculature. Pt has been doing some self stretching, self massaging, and heat with some relief. Pt able to use arms as able without any issue.     Precautions  Precautions  Precautions Comment: HTN       Pain  Pain Assessment: 0-10  0-10 (Numeric) Pain Score: 6  Pain Type: Chronic pain  Pain Location: Neck  Pain Orientation: Right, Left  Pain Descriptors: Aching, Headache, Pounding, Tightness  Pain Frequency:  "Constant/continuous    Barriers to Participation: Co-Pay    OBJECTIVE:    Upper Extremity Strength:  UE strength not listed below is WNL  MMT 5/5 max  LEFT RIGHT   Shoulder Flexion 5 5   Shoulder Abduction 5 5   Shoulder ER 5 5   Shoulder IR 5 5   Elbow Flexion     Elbow Extension     Wrist Flexion     Wrist Extension       Cervical AROM:   Cervical AROM not listed below is WNL    Forward Bendin (80-90)  Backward Bendin (70)  Side Bending:  Left 30 P!  Right 31 (35)  Rotation: Left 79  Right 58 (80-90)        Joint mobility: Decreased Thoracic PA glide from T2-T7, Painful PA cervical glide on C4-5    Posture:  Forward head, Rounded shoulders    Palpation:  Increased Tissue tenderness and tightness in AKIL UT, Levator, Rhomboids, Cervical Paraspinals, Suboccipitals     Special tests:   Cervical compression: Nt   Cervical distraction: Nt   Closed Packed test: Nt   Alar ligament test: -              Sharps-Pursor: -    Other Measures  Neck Disability Index: 19       TREATMENT:     There ex     Seated Chin Tucks x10  Seated UT stretch x30\"  Seated Levator stretch x30\"  Scap retractions x10    Manual  STM to R/L UT, Levator    PATIENT EDUCATION:  Access Code: [email or web address]  URL: [email or web address]  Date: 2024  Prepared by: Herminio Pegler    Exercises  - Seated Cervical Retraction  - 1 x daily - 7 x weekly - 3 sets - 10 reps - 3s hold  - Seated Gentle Upper Trapezius Stretch  - 1 x daily - 7 x weekly - 3 sets - 10 reps - 30s hold  - Gentle Levator Scapulae Stretch  - 1 x daily - 7 x weekly - 3 sets - 10 reps - 30s hold  - Seated Scapular Retraction  - 1 x daily - 7 x weekly - 3 sets - 10 reps    Plan for next visit: STM/DN, Neck ROM, Postural strengthening    ASSESSEMENT  Pt is a 43 y.o.  referred for physical therapy by Anabelle Mcgrath MD  for Neck pain and headaches. Pt presents with increased tissue tension, headaches, decreased neck ROM that are affecting turning head, driving, " completing work tasks, and quality of life. This patient would benefit from a therapy program to restore prior level of function, decrease pain, increase AROM, increase strength and improve posture. Pt tolerated all treatment at this time.     Rehab potential: Good    PLAN  Treatment/Interventions: Aquatic therapy, Biofeedback, Blood flow restriction therapy, Canalith repositioning, Cryotherapy, Dry needling, Education/ Instruction, Electrical stimulation, Fluidotherapy, Hot pack, Iontophoresis, Laser, Manual therapy, Mechanical traction, Neuromuscular re-education, Taping techniques, Therapeutic activities, Therapeutic exercises, Ultrasound, Vasopneumatic device  PT Plan: Skilled PT  PT Frequency: 2 times per week  Duration: 5 weeks  Onset Date: 06/06/24  Rehab Potential: Good  Plan of Care Agreement: Patient    Pt. Is in agreement with PT plan.  Goals:  Active       PT Problem       PT Neck Goals       Start:  08/01/24    Expected End:  09/20/24       1. Pt will adhere to and complete HEP in order to improve functionality outside of the clinic. (2 weeks)    2.   Pt will report 2/10 pain when performing turning head, reading, work tasks and recreational activities in order to improve quality of life and maintain functional independence. (4-5 weeks)    3.   Pt will improve postural awareness in order to reduce reoccurrence of impairments. (2 weeks)    4.   Pt will improve ROM of neck by 10 degrees in order to improve ability to move head in all directions and reduce likelihood of headaches. (3-4 weeks)    5.  Pt goal: Reduce my Headaches    6. Pt will improve Neck Disability Index by 9 points (MCID) to show reduction in disability and improvement in functionality. (4-5 weeks)

## 2024-08-05 ENCOUNTER — HOSPITAL ENCOUNTER (OUTPATIENT)
Dept: RADIOLOGY | Facility: HOSPITAL | Age: 44
Discharge: HOME | End: 2024-08-05
Payer: COMMERCIAL

## 2024-08-05 ENCOUNTER — TREATMENT (OUTPATIENT)
Dept: PHYSICAL THERAPY | Facility: CLINIC | Age: 44
End: 2024-08-05
Payer: COMMERCIAL

## 2024-08-05 DIAGNOSIS — G44.86 CERVICOGENIC HEADACHE: ICD-10-CM

## 2024-08-05 DIAGNOSIS — M54.2 NECK PAIN: ICD-10-CM

## 2024-08-05 DIAGNOSIS — M43.6 NECK RIGID: ICD-10-CM

## 2024-08-05 DIAGNOSIS — G44.229 CHRONIC TENSION HEADACHE: ICD-10-CM

## 2024-08-05 PROCEDURE — 97110 THERAPEUTIC EXERCISES: CPT | Mod: GP | Performed by: PHYSICAL THERAPIST

## 2024-08-05 PROCEDURE — 2550000001 HC RX 255 CONTRASTS: Performed by: FAMILY MEDICINE

## 2024-08-05 PROCEDURE — 70470 CT HEAD/BRAIN W/O & W/DYE: CPT

## 2024-08-05 PROCEDURE — 97140 MANUAL THERAPY 1/> REGIONS: CPT | Mod: GP | Performed by: PHYSICAL THERAPIST

## 2024-08-05 PROCEDURE — 70470 CT HEAD/BRAIN W/O & W/DYE: CPT | Performed by: RADIOLOGY

## 2024-08-05 NOTE — PROGRESS NOTES
"Physical Therapy    Physical Therapy Treatment    Patient Name: Katharine Lewis  MRN: [id number]  Today's Date: 8/5/2024    Time Entry:   Time Calculation  Start Time: 0920  Stop Time: 0958  Time Calculation (min): 38 min     PT Therapeutic Procedures Time Entry  Manual Therapy Time Entry: 23  Therapeutic Exercise Time Entry: 15    Assessment:  Good tolerance to everything in PT session. No adverse events. Headache overall unchanged at end of session. Will continue to benefit from skilled PT services to decrease headaches.     Plan:  Continue skilled PT as planned     Current Problem  1. Neck pain  Follow Up In Physical Therapy      2. Neck rigid  Follow Up In Physical Therapy      3. Chronic tension headache  Follow Up In Physical Therapy          Subjective    Patient reports that she tolerated everything well from last session. She reports her headache pain today as mild and rates it as 3/10. She also reports her usual dizziness and lightheadedness. Ready for another PT session. Good adherence to HEP.     Objective   Treatments:  There ex  Seated Chin Tucks x 10 (HEP)  Seated UT stretch x 30\" (HEP)  Seated Levator stretch x 30\" (HEP)  Scap retractions x 10 (HEP)  Resisted rows green 2x10  B shoulder extension green 2x10  B shoulder ER green 2x10  Horizontal abduction green 2x10  Upper Extremity Bike 3 min both ways     Manual  STM to R/L UT, Levator  Suboccipital release  Grade II-IV lateral/up glides C2-C7    OP EDUCATION:  Reviewed HEP, manual therapy, exercises     Goals:  Active       PT Problem       PT Neck Goals (Progressing)       Start:  08/01/24    Expected End:  09/20/24       1. Pt will adhere to and complete HEP in order to improve functionality outside of the clinic. (2 weeks)    2.   Pt will report 2/10 pain when performing turning head, reading, work tasks and recreational activities in order to improve quality of life and maintain functional independence. (4-5 weeks)    3.   Pt will improve " postural awareness in order to reduce reoccurrence of impairments. (2 weeks)    4.   Pt will improve ROM of neck by 10 degrees in order to improve ability to move head in all directions and reduce likelihood of headaches. (3-4 weeks)    5.  Pt goal: Reduce my Headaches    6. Pt will improve Neck Disability Index by 9 points (MCID) to show reduction in disability and improvement in functionality. (4-5 weeks)        Goal Note       Continued manual therapy for ROM of neck

## 2024-08-08 ENCOUNTER — TREATMENT (OUTPATIENT)
Dept: PHYSICAL THERAPY | Facility: CLINIC | Age: 44
End: 2024-08-08
Payer: COMMERCIAL

## 2024-08-08 DIAGNOSIS — M43.6 NECK RIGID: ICD-10-CM

## 2024-08-08 DIAGNOSIS — G44.229 CHRONIC TENSION HEADACHE: ICD-10-CM

## 2024-08-08 DIAGNOSIS — M54.2 NECK PAIN: ICD-10-CM

## 2024-08-08 PROCEDURE — 97140 MANUAL THERAPY 1/> REGIONS: CPT | Mod: GP,CQ

## 2024-08-08 PROCEDURE — 97110 THERAPEUTIC EXERCISES: CPT | Mod: GP,CQ

## 2024-08-08 ASSESSMENT — PAIN - FUNCTIONAL ASSESSMENT: PAIN_FUNCTIONAL_ASSESSMENT: 0-10

## 2024-08-08 ASSESSMENT — PAIN DESCRIPTION - DESCRIPTORS: DESCRIPTORS: ACHING;HEADACHE

## 2024-08-08 NOTE — PROGRESS NOTES
"Physical Therapy Treatment    Patient Name: Katharine Lewis  MRN: [id number]  Today's Date: 2024  Time Calculation  Start Time: 832  Stop Time: 916  Time Calculation (min): 44 min      Assessment:   Patient identified with name and .   Good tolerance and response to treatment with patient noting reduced HA and less tightness in cervical area afterwards. She voices compliance with current HEP. Progressing towards PT goals.    Plan:  Continue manual treatment and therex to improve activity tolerance and improve sleep.     OP PT Plan  Treatment/Interventions: Aquatic therapy, Biofeedback, Blood flow restriction therapy, Canalith repositioning, Cryotherapy, Dry needling, Education/ Instruction, Electrical stimulation, Fluidotherapy, Hot pack, Iontophoresis, Laser, Manual therapy, Mechanical traction, Neuromuscular re-education, Taping techniques, Therapeutic activities, Therapeutic exercises, Ultrasound, Vasopneumatic device  PT Plan: Skilled PT  PT Frequency: 2 times per week  Duration: 5 weeks  Onset Date: 24  Rehab Potential: Good  Plan of Care Agreement: Patient    Current Problem  Problem List Items Addressed This Visit             ICD-10-CM    Neck pain M54.2    Neck rigid M43.6    Chronic tension headache G44.229       Subjective     Precautions  Precautions  Precautions Comment: HTN    Pain  Pain Assessment: 0-10  0-10 (Numeric) Pain Score:  (5/10 pre-treatment; 3/10 after)  Pain Type: Chronic pain  Pain Location: Neck  Pain Orientation: Right, Left  Pain Descriptors: Aching, Headache  Pain Frequency: Constant/continuous    Objective:  There ex  [18']  Seated Chin Tucks x 10 (HEP)  Seated UT stretch x 30\" (HEP)  Seated Levator stretch x 30\" (HEP)  Scap retractions x 10 (HEP)  Resisted rows magenta tube 2x10  B shoulder extension teal tube 2x10  B shoulder ER green 2x10  Horizontal abduction green 2x10  Upper Extremity Bike 3 min both ways     Manual  [23']  STM to R/L UT, Levator  Suboccipital " release  Grade II-IV lateral/up glides C2-C7  Gentle distraction/manual traction    OP EDUCATION:   Reviewed HEP, manual therapy, exercises        Goals:  Active       PT Problem       PT Neck Goals (Progressing)       Start:  08/01/24    Expected End:  09/20/24       1. Pt will adhere to and complete HEP in order to improve functionality outside of the clinic. (2 weeks)    2.   Pt will report 2/10 pain when performing turning head, reading, work tasks and recreational activities in order to improve quality of life and maintain functional independence. (4-5 weeks)    3.   Pt will improve postural awareness in order to reduce reoccurrence of impairments. (2 weeks)    4.   Pt will improve ROM of neck by 10 degrees in order to improve ability to move head in all directions and reduce likelihood of headaches. (3-4 weeks)    5.  Pt goal: Reduce my Headaches    6. Pt will improve Neck Disability Index by 9 points (MCID) to show reduction in disability and improvement in functionality. (4-5 weeks)        Goal Note       Continued manual therapy for ROM of neck

## 2024-08-12 ENCOUNTER — TREATMENT (OUTPATIENT)
Dept: PHYSICAL THERAPY | Facility: CLINIC | Age: 44
End: 2024-08-12
Payer: COMMERCIAL

## 2024-08-12 DIAGNOSIS — M54.2 NECK PAIN: ICD-10-CM

## 2024-08-12 DIAGNOSIS — M43.6 NECK RIGID: ICD-10-CM

## 2024-08-12 DIAGNOSIS — G44.229 CHRONIC TENSION HEADACHE: ICD-10-CM

## 2024-08-12 PROCEDURE — 97110 THERAPEUTIC EXERCISES: CPT | Mod: GP | Performed by: PHYSICAL THERAPIST

## 2024-08-12 PROCEDURE — 97140 MANUAL THERAPY 1/> REGIONS: CPT | Mod: GP | Performed by: PHYSICAL THERAPIST

## 2024-08-12 NOTE — PROGRESS NOTES
Physical Therapy    Physical Therapy Treatment    Patient Name: Katharine Lewis  MRN: [id number]  Today's Date: 8/12/2024    Time Entry:   Time Calculation  Start Time: 1002  Stop Time: 1040  Time Calculation (min): 38 min     PT Therapeutic Procedures Time Entry  Manual Therapy Time Entry: 23  Therapeutic Exercise Time Entry: 15    Assessment:  No headache today and hasn't had a headache for the past two days. Good tolerance to all exercises and all manual techniques. No adverse reactions. Patient will continue to benefit from skilled PT.     Plan:  Continue PT as planned     Current Problem  1. Neck pain  Follow Up In Physical Therapy      2. Neck rigid  Follow Up In Physical Therapy      3. Chronic tension headache  Follow Up In Physical Therapy        Subjective    Patient reports that she hasn't had a headache in the past two days and that is better than normal. Patient reports that her neck is getting less tight. Patient would like to continue PT. Denies any headache at this time. Ready for another PT treatment session.     Objective   90 degrees of cervical rotation AROM B    Treatments:  Therapeutic exercise  Seated Chin Tucks - HEP reviewed   Seated UT stretch - HEP reviewed   Seated Levator stretch - HEP reviewed   Scap retractions - HEP reviewed   Resisted rows magenta tube 2x10  B shoulder extension teal tube 2x10  B shoulder ER green 2x10  Horizontal abduction green 2x10  Upper Extremity Bike 3 min both ways  Diagonal abduction green x 10 each way     Manual therapy  Suboccipital release  Grade II-IV lateral/up glides C1-C7   Gentle distraction/manual cervical traction  Prone thoracic PA mobilizations grade II-IV  Prone C1-C2 PA unilateral mobs grade II-IV    OP EDUCATION:  Reviewed HEP, continue HEP, exercises     Goals:  Active       PT Problem       PT Neck Goals (Progressing)       Start:  08/01/24    Expected End:  09/20/24       1. Pt will adhere to and complete HEP in order to improve  functionality outside of the clinic. (2 weeks)    2.   Pt will report 2/10 pain when performing turning head, reading, work tasks and recreational activities in order to improve quality of life and maintain functional independence. (4-5 weeks)    3.   Pt will improve postural awareness in order to reduce reoccurrence of impairments. (2 weeks)    4.   Pt will improve ROM of neck by 10 degrees in order to improve ability to move head in all directions and reduce likelihood of headaches. (3-4 weeks)    5.  Pt goal: Reduce my Headaches    6. Pt will improve Neck Disability Index by 9 points (MCID) to show reduction in disability and improvement in functionality. (4-5 weeks)        Goal Note       Less neck tightness, no headaches past two days

## 2024-08-15 ENCOUNTER — TREATMENT (OUTPATIENT)
Dept: PHYSICAL THERAPY | Facility: CLINIC | Age: 44
End: 2024-08-15
Payer: COMMERCIAL

## 2024-08-15 DIAGNOSIS — G44.229 CHRONIC TENSION HEADACHE: ICD-10-CM

## 2024-08-15 DIAGNOSIS — M54.2 NECK PAIN: ICD-10-CM

## 2024-08-15 DIAGNOSIS — M43.6 NECK RIGID: ICD-10-CM

## 2024-08-15 PROCEDURE — 97140 MANUAL THERAPY 1/> REGIONS: CPT | Mod: GP,CQ

## 2024-08-15 PROCEDURE — 97110 THERAPEUTIC EXERCISES: CPT | Mod: GP,CQ

## 2024-08-15 ASSESSMENT — PAIN - FUNCTIONAL ASSESSMENT: PAIN_FUNCTIONAL_ASSESSMENT: 0-10

## 2024-08-15 ASSESSMENT — PAIN DESCRIPTION - DESCRIPTORS: DESCRIPTORS: ACHING;HEADACHE

## 2024-08-15 ASSESSMENT — PAIN SCALES - GENERAL: PAINLEVEL_OUTOF10: 6

## 2024-08-15 NOTE — PROGRESS NOTES
Physical Therapy Treatment    Patient Name: Katharine Lewis  MRN: [id number]  Today's Date: 8/15/2024  Time Calculation  Start Time: 0831  Stop Time: 0914  Time Calculation (min): 43 min  PT Therapeutic Procedures Time Entry  Manual Therapy Time Entry: 24  Therapeutic Exercise Time Entry: 17       Assessment:   Patient identified by name and date of birth. Reviewed intensity of stretching due to report of increased soreness this date, she demonstrated good understanding after. She presented with palpable tension in SCM that responded well to STW.      OBJECTIVE     Plan:  OP PT Plan  Treatment/Interventions: Aquatic therapy, Biofeedback, Blood flow restriction therapy, Canalith repositioning, Cryotherapy, Dry needling, Education/ Instruction, Electrical stimulation, Fluidotherapy, Hot pack, Iontophoresis, Laser, Manual therapy, Mechanical traction, Neuromuscular re-education, Taping techniques, Therapeutic activities, Therapeutic exercises, Ultrasound, Vasopneumatic device  PT Plan: Skilled PT  PT Frequency: 2 times per week  Duration: 5 weeks  Onset Date: 06/06/24  Rehab Potential: Good  Plan of Care Agreement: Patient  Continue with progression of cervical strengthening and ROM for decreased Sx and increased ease with job duties.   Current Problem  Problem List Items Addressed This Visit             ICD-10-CM    Neck pain M54.2    Neck rigid M43.6    Chronic tension headache G44.229       Subjective Patient reported compliance with % of the time and verbalized understanding.  She reported 4/10 pain after treatment.   General  Reason for Referral: neck pain  Referred By: Anabelle Mcgrath  General Comment: Visit 4/11 POC  Precautions  Precautions  Precautions Comment: HTN    Pain  Pain Assessment: 0-10  0-10 (Numeric) Pain Score: 6  Pain Type: Chronic pain  Pain Location: Neck  Pain Orientation: Right, Left  Pain Descriptors: Aching, Headache  Pain Frequency: Constant/continuous     Treatments:  Therapeutic  "Exercise  Therapeutic Exercise Performed: Yes  UBE 3' fwd 3' bwd  Resisted rows magenta tube 2x10  B shoulder extension magenta tube 2x10  B shoulder ER green 2x10  Seated GH Horizontal abd green 2x10   Seated GH Diagonal abd green x 10 each way  Seated Chin Tucks x10 3\" hold   Seated UT stretch 2 x 20  Seated Levator stretch 2 x 20  SCM x20\"   Scap retractions (X)    Manual therapy  STW to SCM's,UT's, suboccipital  Suboccipital release   Grade II-IV lateral/up glides C1-C7   Gentle distraction/manual cervical traction  Prone thoracic PA mobilizations grade II-IV  Prone C1-C2 PA unilateral mobs grade II-IV    Goals:  Active       PT Problem       PT Neck Goals (Progressing)       Start:  08/01/24    Expected End:  09/20/24       1. Pt will adhere to and complete HEP in order to improve functionality outside of the clinic. (2 weeks)    2.   Pt will report 2/10 pain when performing turning head, reading, work tasks and recreational activities in order to improve quality of life and maintain functional independence. (4-5 weeks)    3.   Pt will improve postural awareness in order to reduce reoccurrence of impairments. (2 weeks)    4.   Pt will improve ROM of neck by 10 degrees in order to improve ability to move head in all directions and reduce likelihood of headaches. (3-4 weeks)    5.  Pt goal: Reduce my Headaches    6. Pt will improve Neck Disability Index by 9 points (MCID) to show reduction in disability and improvement in functionality. (4-5 weeks)        Goal Note       Less neck tightness, no headaches past two days                 "

## 2024-08-19 ENCOUNTER — TREATMENT (OUTPATIENT)
Dept: PHYSICAL THERAPY | Facility: CLINIC | Age: 44
End: 2024-08-19
Payer: COMMERCIAL

## 2024-08-19 DIAGNOSIS — M54.2 NECK PAIN: ICD-10-CM

## 2024-08-19 DIAGNOSIS — M43.6 NECK RIGID: ICD-10-CM

## 2024-08-19 DIAGNOSIS — G44.229 CHRONIC TENSION HEADACHE: ICD-10-CM

## 2024-08-19 PROCEDURE — 97140 MANUAL THERAPY 1/> REGIONS: CPT | Mod: GP,CQ

## 2024-08-19 PROCEDURE — 97110 THERAPEUTIC EXERCISES: CPT | Mod: GP,CQ

## 2024-08-19 ASSESSMENT — PAIN SCALES - GENERAL: PAINLEVEL_OUTOF10: 0 - NO PAIN

## 2024-08-19 ASSESSMENT — PAIN DESCRIPTION - DESCRIPTORS: DESCRIPTORS: TIGHTNESS

## 2024-08-19 ASSESSMENT — PAIN - FUNCTIONAL ASSESSMENT: PAIN_FUNCTIONAL_ASSESSMENT: 0-10

## 2024-08-19 NOTE — PROGRESS NOTES
Physical Therapy Treatment    Patient Name: Katharine Lewis  MRN: [id number]  Today's Date: 2024  Time Calculation  Start Time: 1131  Stop Time: 1213  Time Calculation (min): 42 min  PT Therapeutic Procedures Time Entry  Manual Therapy Time Entry: 15  Therapeutic Exercise Time Entry: 25         Assessment:   Patient identified by name & .  Treatment consisted of postural and cervical strengthening and manual therapy. Patient able to tolerate treatment without any increase in symptoms. Felt decreased tightness after STW. DN was done at end of treatment by evaluating therapist.     Plan:  Continue with postural strengthening and manual therapy for decreased tension in muscles and decreased headaches. -MA   OP PT Plan  Treatment/Interventions: Aquatic therapy, Biofeedback, Blood flow restriction therapy, Canalith repositioning, Cryotherapy, Dry needling, Education/ Instruction, Electrical stimulation, Fluidotherapy, Hot pack, Iontophoresis, Laser, Manual therapy, Mechanical traction, Neuromuscular re-education, Taping techniques, Therapeutic activities, Therapeutic exercises, Ultrasound, Vasopneumatic device  PT Plan: Skilled PT  PT Frequency: 2 times per week  Duration: 5 weeks  Onset Date: 24  Rehab Potential: Good  Plan of Care Agreement: Patient    Current Problem  Problem List Items Addressed This Visit             ICD-10-CM    Neck pain M54.2    Neck rigid M43.6    Chronic tension headache G44.229       Subjective   Currently does not have a headache. She gets a headache at some point in the day, every day. Denies neck pain and describes her symptoms more of an extreme tightness in the neck area. Treatment is helping loosen the muscles of her neck, but it does not last long term. Overall, she feel like she does not have as much tightness as she did at the start.   General  Reason for Referral: neck pain  Referred By: Anabelle Mcgrath  General Comment: Visit  POC  Precautions  Precautions  Precautions  "Comment: HTN    Pain  Pain Assessment: 0-10  0-10 (Numeric) Pain Score: 0 - No pain  Pain Type: Chronic pain  Pain Location: Neck  Pain Orientation: Right, Left  Pain Descriptors: Tightness    Objective:     Treatments:  Therapeutic Exercise: x 25'   Therapeutic Exercise Performed: Yes  UBE 3' fwd 3' bwd, Lv-1  Resisted rows magenta tube 2x10  B shoulder extension magenta tube 2x10  B shoulder ER green 2x10  Seated GH Horizontal abd green 2x10   Seated GH Diagonal abd green x 10 each way  Seated Chin Tucks x10 3\" hold   Seated UT stretch 2 x 20    Below not completed:   Seated Levator stretch 2 x 20  SCM x20\"   Scap retractions (X)    Manual therapy x15'  STW to SCM's,UT's, suboccipital, medial border of scaps (N), levators (N)  Suboccipital release   Manual PROM:   - cervical lateral flexion x5 ea side (N)  - cervical rotation x5 ea side (N)   Manual stretches:   - UT stretch 15\" x3 ea side (N)  - Levator stretch 15\" x3 ea side (N)   DN: 7 40 mm needles in and 7 40 mm needles out, 2 to R UT, 2 to R levator, 1 to L UT, and 2 to L Levator with pistoning and fanning technique, Pt with informed consent prior to treatment and no adverse affects after, Herminio Hoffmann PT, DPT  Below not completed:   Grade II-IV lateral/up glides C1-C7   Gentle distraction/manual cervical traction  Prone thoracic PA mobilizations grade II-IV  Prone C1-C2 PA unilateral mobs grade II-IV    Education:   Access Code: [email or web address]  URL: [email or web address]  Date: 08/01/2024  Prepared by: Herminio Hoffmann    Exercises  - Seated Cervical Retraction  - 1 x daily - 7 x weekly - 3 sets - 10 reps - 3s hold  - Seated Gentle Upper Trapezius Stretch  - 1 x daily - 7 x weekly - 3 sets - 10 reps - 30s hold  - Gentle Levator Scapulae Stretch  - 1 x daily - 7 x weekly - 3 sets - 10 reps - 30s hold  - Seated Scapular Retraction  - 1 x daily - 7 x weekly - 3 sets - 10 reps  Goals:  Active       PT Problem       PT Neck Goals (Progressing)       " Start:  08/01/24    Expected End:  09/20/24       1. Pt will adhere to and complete HEP in order to improve functionality outside of the clinic. (2 weeks)    2.   Pt will report 2/10 pain when performing turning head, reading, work tasks and recreational activities in order to improve quality of life and maintain functional independence. (4-5 weeks)    3.   Pt will improve postural awareness in order to reduce reoccurrence of impairments. (2 weeks)    4.   Pt will improve ROM of neck by 10 degrees in order to improve ability to move head in all directions and reduce likelihood of headaches. (3-4 weeks)    5.  Pt goal: Reduce my Headaches    6. Pt will improve Neck Disability Index by 9 points (MCID) to show reduction in disability and improvement in functionality. (4-5 weeks)        Goal Note       Less neck tightness, no headaches past two days

## 2024-08-21 ENCOUNTER — HOSPITAL ENCOUNTER (OUTPATIENT)
Dept: HOSPITAL 100 - MTLAB | Age: 44
Discharge: HOME | End: 2024-08-21
Payer: COMMERCIAL

## 2024-08-21 DIAGNOSIS — R76.8: ICD-10-CM

## 2024-08-21 DIAGNOSIS — M17.12: ICD-10-CM

## 2024-08-21 DIAGNOSIS — M06.4: Primary | ICD-10-CM

## 2024-08-21 LAB
ALANINE AMINOTRANSFER ALT/SGPT: 32 U/L (ref 13–56)
ALBUMIN SERPL-MCNC: 3.2 G/DL (ref 3.2–5)
ALKALINE PHOSPHATASE: 105 U/L (ref 45–117)
ANION GAP: 5 (ref 5–15)
AST(SGOT): 21 U/L (ref 15–37)
BUN SERPL-MCNC: 12 MG/DL (ref 7–18)
BUN/CREAT RATIO: 14.5 RATIO (ref 10–20)
CALCIUM SERPL-MCNC: 8.8 MG/DL (ref 8.5–10.1)
CARBON DIOXIDE: 27 MMOL/L (ref 21–32)
CHLORIDE: 106 MMOL/L (ref 98–107)
CREAT UR-MCNC: 80.2 MG/DL
CRP SERPL-MCNC: 3.91 MG/L (ref 0–3)
DEPRECATED RDW RBC: 45.1 FL (ref 35.1–43.9)
ERYTHROCYTE [DISTWIDTH] IN BLOOD: 14.7 % (ref 11.6–14.6)
EST GLOM FILT RATE - AFR AMER: 98 ML/MIN (ref 60–?)
GLOBULIN: 3.9 G/DL (ref 2.2–4.2)
GLUCOSE: 92 MG/DL (ref 74–106)
HCT VFR BLD AUTO: 38.7 % (ref 37–47)
HEMOGLOBIN: 12 G/DL (ref 12–15)
HGB BLD-MCNC: 12 G/DL (ref 12–15)
IMMATURE GRANULOCYTES COUNT: 0.02 X10^3/UL (ref 0–0)
LEUKOCYTE ESTERASE UR QL STRIP: 100 /UL
MCV RBC: 83.9 FL (ref 81–99)
MEAN CORP HGB CONC: 31 G/DL (ref 32–36)
MEAN PLATELET VOL.: 9.9 FL (ref 6.2–12)
NRBC FLAGGED BY ANALYZER: 0 % (ref 0–5)
PLATELET # BLD: 282 K/MM3 (ref 150–450)
PLATELET COUNT: 282 K/MM3 (ref 150–450)
POTASSIUM: 4 MMOL/L (ref 3.5–5.1)
PROT UR-MCNC: 10.5 MG/DL (ref ?–11.9)
PROT/CREAT UR: 131 MG/G CRE (ref 0–200)
RBC # BLD AUTO: 4.61 M/MM3 (ref 4.2–5.4)
RBC DISTRIBUTION WIDTH CV: 14.7 % (ref 11.6–14.6)
RBC DISTRIBUTION WIDTH SD: 45.1 FL (ref 35.1–43.9)
SP GR UR: 1 (ref 1–1.03)
URINE PRESERVATIVE: (no result)
WBC # BLD AUTO: 5.5 K/MM3 (ref 4.4–11)
WHITE BLOOD COUNT: 5.5 K/MM3 (ref 4.4–11)

## 2024-08-21 PROCEDURE — 36415 COLL VENOUS BLD VENIPUNCTURE: CPT

## 2024-08-21 PROCEDURE — 86140 C-REACTIVE PROTEIN: CPT

## 2024-08-21 PROCEDURE — 80053 COMPREHEN METABOLIC PANEL: CPT

## 2024-08-21 PROCEDURE — 85652 RBC SED RATE AUTOMATED: CPT

## 2024-08-21 PROCEDURE — 85025 COMPLETE CBC W/AUTO DIFF WBC: CPT

## 2024-08-21 PROCEDURE — 87340 HEPATITIS B SURFACE AG IA: CPT

## 2024-08-21 PROCEDURE — 86803 HEPATITIS C AB TEST: CPT

## 2024-08-21 PROCEDURE — 81002 URINALYSIS NONAUTO W/O SCOPE: CPT

## 2024-08-21 PROCEDURE — 73564 X-RAY EXAM KNEE 4 OR MORE: CPT

## 2024-08-21 PROCEDURE — 84156 ASSAY OF PROTEIN URINE: CPT

## 2024-08-21 PROCEDURE — 82570 ASSAY OF URINE CREATININE: CPT

## 2024-08-21 PROCEDURE — 86706 HEP B SURFACE ANTIBODY: CPT

## 2024-08-21 PROCEDURE — 72050 X-RAY EXAM NECK SPINE 4/5VWS: CPT

## 2024-08-21 NOTE — RAD_ITS
REPORT-ID:[id number] 
 
STUDY:   X-RAY - LEFT KNEE 
 
REASON FOR EXAM:   Female, 44 years old.  PAIN 
 
TECHNIQUE:   4 view(s) of the knee. 
 
COMPARISON:   None. 
 
FINDINGS: 
Normal visualized distal femur.  Normal visualized proximal tibia and 
fibula.  Normal proximal tibiofibular articulation. 
 
Normal medial femorotibial compartment.  Normal lateral femorotibial 
compartment.  Normal patellofemoral articulation. 
 
The soft tissue structures are unremarkable. 
___________________________________ 
 
ORDER #: [id number] RAD/Knee 4 or More Views  
IMPRESSION:  
Normal x-ray examination of the knee.  
 
  
Electronically Signed:  
Chinedu Swan MD  
2024/08/21 at 12:39 EDT  
Reading Location ID and State: [address] / [place]  
Tel [phone], Service support  [phone], Fax [phone]

## 2024-08-21 NOTE — RAD_ITS
REPORT-ID:[id number] 
 
STUDY:  X-RAY - CERVICAL SPINE 
 
REASON FOR EXAM:   Female, 44 years old.  PAIN 
 
TECHNIQUE:   5 view(s) of the cervical spine were obtained. 
 
COMPARISON:   None 
___________________________________ 
 
FINDINGS: 
Normal anterior atlantoaxial articulation.  Normal odontoid process. 
 
Normal cervical lordosis.  Normal vertebral bodies and endplates.  Normal 
disc space heights. Normal visualized intervertebral neuroforamina. 
 
The soft tissue structures are unremarkable. 
___________________________________ 
 
ORDER #: [id number] RAD/Cerv Spine 4 or 5 Views  
IMPRESSION:  
Normal x-ray examination of the visualized cervical spine.  
 
  
Electronically Signed:  
Chinedu Swan MD  
2024/08/21 at 13:05 EDT  
Reading Location ID and State: [address] / [place]  
Tel [phone], Service support  [phone], Fax [phone]

## 2024-08-22 ENCOUNTER — APPOINTMENT (OUTPATIENT)
Dept: PHYSICAL THERAPY | Facility: CLINIC | Age: 44
End: 2024-08-22
Payer: COMMERCIAL

## 2024-08-26 ENCOUNTER — APPOINTMENT (OUTPATIENT)
Age: 44
End: 2024-08-26
Payer: COMMERCIAL

## 2024-08-26 ENCOUNTER — TREATMENT (OUTPATIENT)
Dept: PHYSICAL THERAPY | Facility: CLINIC | Age: 44
End: 2024-08-26
Payer: COMMERCIAL

## 2024-08-26 VITALS
WEIGHT: 246 LBS | HEIGHT: 67 IN | HEART RATE: 68 BPM | SYSTOLIC BLOOD PRESSURE: 128 MMHG | DIASTOLIC BLOOD PRESSURE: 68 MMHG | BODY MASS INDEX: 38.61 KG/M2

## 2024-08-26 DIAGNOSIS — M54.2 NECK PAIN: ICD-10-CM

## 2024-08-26 DIAGNOSIS — M43.6 NECK RIGID: ICD-10-CM

## 2024-08-26 DIAGNOSIS — G44.229 CHRONIC TENSION HEADACHE: ICD-10-CM

## 2024-08-26 DIAGNOSIS — R41.840 DIFFICULTY CONCENTRATING: ICD-10-CM

## 2024-08-26 DIAGNOSIS — I10 PRIMARY HYPERTENSION: ICD-10-CM

## 2024-08-26 DIAGNOSIS — G44.229 CHRONIC TENSION-TYPE HEADACHE, NOT INTRACTABLE: Primary | ICD-10-CM

## 2024-08-26 PROCEDURE — 1036F TOBACCO NON-USER: CPT | Performed by: FAMILY MEDICINE

## 2024-08-26 PROCEDURE — 99214 OFFICE O/P EST MOD 30 MIN: CPT | Performed by: FAMILY MEDICINE

## 2024-08-26 PROCEDURE — 3074F SYST BP LT 130 MM HG: CPT | Performed by: FAMILY MEDICINE

## 2024-08-26 PROCEDURE — 97140 MANUAL THERAPY 1/> REGIONS: CPT | Mod: GP,CQ

## 2024-08-26 PROCEDURE — 3078F DIAST BP <80 MM HG: CPT | Performed by: FAMILY MEDICINE

## 2024-08-26 PROCEDURE — 97110 THERAPEUTIC EXERCISES: CPT | Mod: GP,CQ

## 2024-08-26 PROCEDURE — 3008F BODY MASS INDEX DOCD: CPT | Performed by: FAMILY MEDICINE

## 2024-08-26 ASSESSMENT — PAIN SCALES - GENERAL: PAINLEVEL_OUTOF10: 4

## 2024-08-26 ASSESSMENT — PAIN - FUNCTIONAL ASSESSMENT: PAIN_FUNCTIONAL_ASSESSMENT: 0-10

## 2024-08-26 NOTE — PROGRESS NOTES
"     Subjective  Katharine Lewis is a 44 y.o. female who presents for Follow-up.  HPI  Here for follow up.  See previous notes.  She states she is getting a new kind of headache.  This is in her forehead, constant pain in frontal area.  Tries not to take anything for it unless severe.  Just had eye exam. Had CT scan brain end of July.    Is getting PT for headaches that start at the base of her neck.  States may be some better there.  States yesterday she did not take her medication,any of them,and felt great.   Had the best day she's had in a long time.  \"Brain fog\" is better, hasn't noticed that as often.  Tolerating lexapro and does feel better overall on it.  Still having lightheadedness with exertion, after exertion actually. Feels a wave come over her and sometimes feels like she is going to pass out and has to sit down. Had echo, holter, okay.  Takes losartan in morning.  Had gastroenteritis last week that has resolved, but wasn't eating much the day prior to forgetting her medicine and feeling well.   Her  suggested a glucose monitor.  Has been to rheum, further testing.  Not currently high suspicion for rheum disease but going to investigate further it sounds like.   Review of Systems   All other systems reviewed and are negative.  .    Current Outpatient Medications:     atorvastatin (Lipitor) 40 mg tablet, TAKE 1 TABLET BY MOUTH AT  BEDTIME, Disp: 90 tablet, Rfl: 3    cholecalciferol (Vitamin D-3) 25 MCG (1000 UT) tablet, Take by mouth., Disp: , Rfl:     escitalopram (Lexapro) 10 mg tablet, Take 1 tablet (10 mg) by mouth once daily., Disp: 30 tablet, Rfl: 2    losartan (Cozaar) 100 mg tablet, TAKE 1 TABLET BY MOUTH DAILY, Disp: 90 tablet, Rfl: 3     Objective     Visit Vitals  /68 Comment: left arm seated large cuff   Pulse 68      Physical Exam  Vitals reviewed.   HENT:      Head: Normocephalic.   Pulmonary:      Effort: Pulmonary effort is normal.   Neurological:      Mental Status: " She is alert.         Assessment/Plan   Problem List Items Addressed This Visit       Hypertension    Chronic tension headache - Primary     Other Visit Diagnoses       Difficulty concentrating              Discussed several options.  She felt better off meds, discussed switching bp med, stopping statin for awhile, she's hesitant for both.  Decided to increase lexapro to 20 mg, as she feels some better on the 10, and followup in a few weeks and see what is better and what is not.  Call any new or worsening concerns in the interim.         Anabelle Mcgrath MD

## 2024-08-26 NOTE — PROGRESS NOTES
Physical Therapy Treatment    Patient Name: Katharine Lewis  MRN: [id number]  Today's Date: 8/26/2024  Time Calculation  Start Time: 1045  Stop Time: 1133  Time Calculation (min): 48 min  PT Therapeutic Procedures Time Entry  Manual Therapy Time Entry: 15  Therapeutic Exercise Time Entry: 30       Assessment:   Patient responds well to STM and progressions of reps with no c/o increased symptoms. Demo's restriction in cervical paraspinals with improvements noted at end of session.     Plan:  Continue to work on improving strength and decreasing pain to be able to perform household chores with little to no difficulty. -AB.     OP PT Plan  Treatment/Interventions: Aquatic therapy, Biofeedback, Blood flow restriction therapy, Canalith repositioning, Cryotherapy, Dry needling, Education/ Instruction, Electrical stimulation, Fluidotherapy, Hot pack, Iontophoresis, Laser, Manual therapy, Mechanical traction, Neuromuscular re-education, Taping techniques, Therapeutic activities, Therapeutic exercises, Ultrasound, Vasopneumatic device  PT Plan: Skilled PT  PT Frequency: 2 times per week  Duration: 5 weeks  Onset Date: 06/06/24  Rehab Potential: Good  Plan of Care Agreement: Patient    Current Problem  Problem List Items Addressed This Visit             ICD-10-CM    Chronic tension headache - Primary G44.229     Other Visit Diagnoses         Codes    Neck pain     M54.2    Neck rigid     M43.6            Subjective   General  Reason for Referral: neck pain  Referred By: Anabelle Mcgrath  General Comment: Visit 6/11 POC  HEP: Yes  Patient states that she typically has a headache after therapy but typically feels better the next day. States that she is doing her HEP.     Precautions  Precautions  Precautions Comment: HTN    Pain  Pain Assessment: 0-10  0-10 (Numeric) Pain Score: 4  Pain Location: Shoulder  Pain Orientation: Left    Objective     Treatments:  Therapeutic Exercise: 30 minutes, 2 units  UBE 3' fwd 3' bwd,  "Lv-1  Resisted rows magenta tube 2x10  B shoulder extension magenta tube 2x10  B shoulder ER green 2x10  Seated GH Horizontal abd green 2x10 (P, reps)   Seated GH Diagonal abd green 2x10 each way (P, reps)   Seated Chin Tucks x10 3\" hold (X)  Seated UT stretch 2 x 20 (X)    Below not completed:   Seated Levator stretch 2 x 20  SCM x20\"   Scap retractions (X)    Manual therapy x15  STW to SCM's,UT's, suboccipital, medial border of scaps (N), levators (N)  Suboccipital release   Manual PROM:   - cervical lateral flexion x5 ea side (N)  - cervical rotation x5 ea side (N)   Manual stretches:   - UT stretch 15\" x3 ea side (N)  - Levator stretch 15\" x3 ea side (N)   DN: 7 40 mm needles in and 7 40 mm needles out, 2 to R UT, 2 to R levator, 1 to L UT, and 2 to L Levator with pistoning and fanning technique, Pt with informed consent prior to treatment and no adverse affects after, Herminio Hoffmann PT, DPT  Below not completed:   Grade II-IV lateral/up glides C1-C7   Gentle distraction/manual cervical traction  Prone thoracic PA mobilizations grade II-IV  Prone C1-C2 PA unilateral mobs grade II-IV    OP EDUCATION:   Access Code: [email or web address]  URL: [email or web address]  Date: 08/01/2024  Prepared by: Herminio Hoffmann    Exercises  - Seated Cervical Retraction  - 1 x daily - 7 x weekly - 3 sets - 10 reps - 3s hold  - Seated Gentle Upper Trapezius Stretch  - 1 x daily - 7 x weekly - 3 sets - 10 reps - 30s hold  - Gentle Levator Scapulae Stretch  - 1 x daily - 7 x weekly - 3 sets - 10 reps - 30s hold  - Seated Scapular Retraction  - 1 x daily - 7 x weekly - 3 sets - 10 reps    Goals:  Active       PT Problem       PT Neck Goals (Progressing)       Start:  08/01/24    Expected End:  09/20/24       1. Pt will adhere to and complete HEP in order to improve functionality outside of the clinic. (2 weeks)    2.   Pt will report 2/10 pain when performing turning head, reading, work tasks and recreational activities in order " to improve quality of life and maintain functional independence. (4-5 weeks)    3.   Pt will improve postural awareness in order to reduce reoccurrence of impairments. (2 weeks)    4.   Pt will improve ROM of neck by 10 degrees in order to improve ability to move head in all directions and reduce likelihood of headaches. (3-4 weeks)    5.  Pt goal: Reduce my Headaches    6. Pt will improve Neck Disability Index by 9 points (MCID) to show reduction in disability and improvement in functionality. (4-5 weeks)        Goal Note       Less neck tightness, no headaches past two days

## 2024-08-29 ENCOUNTER — TREATMENT (OUTPATIENT)
Dept: PHYSICAL THERAPY | Facility: CLINIC | Age: 44
End: 2024-08-29
Payer: COMMERCIAL

## 2024-08-29 DIAGNOSIS — G44.229 CHRONIC TENSION HEADACHE: ICD-10-CM

## 2024-08-29 DIAGNOSIS — M43.6 NECK RIGID: ICD-10-CM

## 2024-08-29 DIAGNOSIS — M54.2 NECK PAIN: ICD-10-CM

## 2024-08-29 PROCEDURE — 97110 THERAPEUTIC EXERCISES: CPT | Mod: GP,CQ

## 2024-08-29 PROCEDURE — 97140 MANUAL THERAPY 1/> REGIONS: CPT | Mod: GP,CQ

## 2024-08-29 ASSESSMENT — PAIN - FUNCTIONAL ASSESSMENT: PAIN_FUNCTIONAL_ASSESSMENT: 0-10

## 2024-08-29 ASSESSMENT — PAIN SCALES - GENERAL: PAINLEVEL_OUTOF10: 3

## 2024-08-29 NOTE — PROGRESS NOTES
"Physical Therapy Treatment    Patient Name: Katharine Lewis  MRN: [id number]  Today's Date: 8/29/2024  Time Calculation  Start Time: 0915  Stop Time: 0956  Time Calculation (min): 41 min  PT Therapeutic Procedures Time Entry  Manual Therapy Time Entry: 12  Therapeutic Exercise Time Entry: 28         Current Problem  Problem List Items Addressed This Visit             ICD-10-CM    Chronic tension headache G44.229     Other Visit Diagnoses         Codes    Neck pain     M54.2    Neck rigid     M43.6            Subjective   Pt.'s name and birthday confirmed.  Pt. Is compliant with HEP.  Pt. C/o mild HA this date.    Reason for Referral: neck pain  Referred By: Anabelle Mcgrath  General Comment: Visit 7/11 POC      Precautions  Precautions  Precautions Comment: HTN      Pain  Pain Assessment: 0-10  0-10 (Numeric) Pain Score: 3  Pain Location: Shoulder  Pain Orientation: Left    Objective:  Treatments:  Therapeutic Exercise: 28 minutes, 2 units  UBE 3' fwd 3' bwd, Lv-1  Resisted rows magenta tube 2x10  B shoulder extension magenta tube 2x10c    B shoulder ER green 2x10  Seated GH Horizontal abd green 2x10   Seated GH Diagonal abd green 2x10 each way  Seated Chin Tucks x10 3\" hold (X)  Seated UT stretch 2 x 20 (X)     Below not completed:   Seated Levator stretch 2 x 20  SCM x20\"   Scap retractions (X)     Manual therapy x12  STW to SCM's,UT's, suboccipital, medial border of scaps (N), levators (N)  Suboccipital release   Manual PROM:   - cervical lateral flexion x5 ea side (N)  - cervical rotation x5 ea side (N)   Manual stretches:   - UT stretch 15\" x3 ea side (N)  - Levator stretch 15\" x3 ea side (N)   DN: 7 40 mm needles in and 7 40 mm needles out, 2 to R UT, 2 to R levator, 1 to L UT, and 2 to L Levator with pistoning and fanning technique, Pt with informed consent prior to treatment and no adverse affects after, Herminio Hoffmann PT, DPT  Below not completed:   Grade II-IV lateral/up glides C1-C7   Gentle distraction/manual " cervical traction  Prone thoracic PA mobilizations grade II-IV  Prone C1-C2 PA unilateral mobs grade II-IV           OP EDUCATION:  Access Code: [email or web address]  URL: [email or web address]  Date: 08/01/2024  Prepared by: Herminio Hoffmann     Exercises  - Seated Cervical Retraction  - 1 x daily - 7 x weekly - 3 sets - 10 reps - 3s hold  - Seated Gentle Upper Trapezius Stretch  - 1 x daily - 7 x weekly - 3 sets - 10 reps - 30s hold  - Gentle Levator Scapulae Stretch  - 1 x daily - 7 x weekly - 3 sets - 10 reps - 30s hold  - Seated Scapular Retraction  - 1 x daily - 7 x weekly - 3 sets - 10 reps           Assessment:  Fair tolerance with TE and stretches, no c/o increased sx.'s noted.  Palpable tenderness noted with upper cervical and with CT junction.  Pt. Reports relief following session.       Plan:  Continue with strengthening, ROM and flexibility per tolerance to improve daily activities/work duties with little to no difficulty.  MB       OP PT Plan  Treatment/Interventions: Aquatic therapy, Biofeedback, Blood flow restriction therapy, Canalith repositioning, Cryotherapy, Dry needling, Education/ Instruction, Electrical stimulation, Fluidotherapy, Hot pack, Iontophoresis, Laser, Manual therapy, Mechanical traction, Neuromuscular re-education, Taping techniques, Therapeutic activities, Therapeutic exercises, Ultrasound, Vasopneumatic device  PT Plan: Skilled PT  PT Frequency: 2 times per week  Duration: 5 weeks  Onset Date: 06/06/24  Rehab Potential: Good  Plan of Care Agreement: Patient              Goals:  Active       PT Problem       PT Neck Goals (Progressing)       Start:  08/01/24    Expected End:  09/20/24       1. Pt will adhere to and complete HEP in order to improve functionality outside of the clinic. (2 weeks)    2.   Pt will report 2/10 pain when performing turning head, reading, work tasks and recreational activities in order to improve quality of life and maintain functional  independence. (4-5 weeks)    3.   Pt will improve postural awareness in order to reduce reoccurrence of impairments. (2 weeks)    4.   Pt will improve ROM of neck by 10 degrees in order to improve ability to move head in all directions and reduce likelihood of headaches. (3-4 weeks)    5.  Pt goal: Reduce my Headaches    6. Pt will improve Neck Disability Index by 9 points (MCID) to show reduction in disability and improvement in functionality. (4-5 weeks)        Goal Note       Less neck tightness, no headaches past two days

## 2024-09-03 ENCOUNTER — TREATMENT (OUTPATIENT)
Dept: PHYSICAL THERAPY | Facility: CLINIC | Age: 44
End: 2024-09-03
Payer: COMMERCIAL

## 2024-09-03 DIAGNOSIS — G44.229 CHRONIC TENSION-TYPE HEADACHE, NOT INTRACTABLE: Primary | ICD-10-CM

## 2024-09-03 DIAGNOSIS — M43.6 NECK RIGID: ICD-10-CM

## 2024-09-03 DIAGNOSIS — G44.229 CHRONIC TENSION HEADACHE: ICD-10-CM

## 2024-09-03 DIAGNOSIS — M54.2 NECK PAIN: ICD-10-CM

## 2024-09-03 PROCEDURE — 97140 MANUAL THERAPY 1/> REGIONS: CPT | Mod: GP,CQ

## 2024-09-03 PROCEDURE — 97110 THERAPEUTIC EXERCISES: CPT | Mod: GP,CQ

## 2024-09-03 ASSESSMENT — PAIN - FUNCTIONAL ASSESSMENT: PAIN_FUNCTIONAL_ASSESSMENT: 0-10

## 2024-09-03 ASSESSMENT — PAIN SCALES - GENERAL: PAINLEVEL_OUTOF10: 2

## 2024-09-03 NOTE — PROGRESS NOTES
Physical Therapy Treatment    Patient Name: Katharine Lewis  MRN: [id number]  Today's Date: 9/3/2024  Time Calculation  Start Time: 1046  Stop Time: 1128  Time Calculation (min): 42 min  PT Therapeutic Procedures Time Entry  Manual Therapy Time Entry: 23  Therapeutic Exercise Time Entry: 17       Assessment:   Patient responds well to STM this date. Patient able to tolerate PRE's with no c/o increased symptoms. Does demo improvements in myofascial restriction in cervical paraspinals.     Plan:  Continue to work on improving strength and decreasing recurring headaches to be able to tolerate normal household chores with little to no difficulty. -AB.     OP PT Plan  Treatment/Interventions: Aquatic therapy, Biofeedback, Blood flow restriction therapy, Canalith repositioning, Cryotherapy, Dry needling, Education/ Instruction, Electrical stimulation, Fluidotherapy, Hot pack, Iontophoresis, Laser, Manual therapy, Mechanical traction, Neuromuscular re-education, Taping techniques, Therapeutic activities, Therapeutic exercises, Ultrasound, Vasopneumatic device  PT Plan: Skilled PT  PT Frequency: 2 times per week  Duration: 5 weeks  Onset Date: 06/06/24  Rehab Potential: Good  Plan of Care Agreement: Patient    Current Problem  Problem List Items Addressed This Visit             ICD-10-CM    Chronic tension headache - Primary G44.229     Other Visit Diagnoses         Codes    Neck pain     M54.2    Neck rigid     M43.6            Subjective   General  Reason for Referral: neck pain  Referred By: Anabelle Mcgrath  General Comment: Visit 8/11 POC  HEP: Yes  Patient states that she does notice a difference in her headaches. States that she does still have them, but they are not as intense as they used to be.     Precautions  Precautions  Precautions Comment: HTN    Pain  Pain Assessment: 0-10  0-10 (Numeric) Pain Score: 2  Pain Location: Shoulder  Pain Orientation: Left    Objective     Treatments:  Therapeutic Exercise: 17 minutes,  "1 units  UBE 3' fwd 3' bwd, Lv-1  Resisted rows magenta tube 2x10  B shoulder extension magenta tube 2x10  B shoulder ER Mint green 2x10  Seated GH Horizontal abd Mint green 2x10   Seated GH Diagonal abd Mint green 2x10 each way  Supine Cervical rotation x10 5\" hold Bilat (N)  Seated Chin Tucks x10 3\" hold (X)  Seated UT stretch 2 x 20 (X)     Below not completed:   Seated Levator stretch 2 x 20  SCM x20\"   Scap retractions (X)     Manual therapy: 23 minutes, 2 units  STW to SCM's,UT's, suboccipital, medial border of scaps (N), levators   Suboccipital release    Not today: 9-3-24   Manual PROM:   - cervical lateral flexion x5 ea side (N)  - cervical rotation x5 ea side (N)   Manual stretches:   - UT stretch 15\" x3 ea side (N)  - Levator stretch 15\" x3 ea side (N)   DN: 7 40 mm needles in and 7 40 mm needles out, 2 to R UT, 2 to R levator, 1 to L UT, and 2 to L Levator with pistoning and fanning technique, Pt with informed consent prior to treatment and no adverse affects after, Herminio Hoffmann PT, DTaP (X)  Below not completed:   Grade II-IV lateral/up glides C1-C7   Gentle distraction/manual cervical traction  Prone thoracic PA mobilizations grade II-IV  Prone C1-C2 PA unilateral mobs grade II-IV    OP EDUCATION:   Access Code: [email or web address]  URL: [email or web address]  Date: 08/01/2024  Prepared by: Herminio Hoffmann     Exercises  - Seated Cervical Retraction  - 1 x daily - 7 x weekly - 3 sets - 10 reps - 3s hold  - Seated Gentle Upper Trapezius Stretch  - 1 x daily - 7 x weekly - 3 sets - 10 reps - 30s hold  - Gentle Levator Scapulae Stretch  - 1 x daily - 7 x weekly - 3 sets - 10 reps - 30s hold  - Seated Scapular Retraction  - 1 x daily - 7 x weekly - 3 sets - 10 reps    Goals:  Active       PT Problem       PT Neck Goals (Progressing)       Start:  08/01/24    Expected End:  09/20/24       1. Pt will adhere to and complete HEP in order to improve functionality outside of the clinic. (2 weeks)    2.  "  Pt will report 2/10 pain when performing turning head, reading, work tasks and recreational activities in order to improve quality of life and maintain functional independence. (4-5 weeks)    3.   Pt will improve postural awareness in order to reduce reoccurrence of impairments. (2 weeks)    4.   Pt will improve ROM of neck by 10 degrees in order to improve ability to move head in all directions and reduce likelihood of headaches. (3-4 weeks)    5.  Pt goal: Reduce my Headaches    6. Pt will improve Neck Disability Index by 9 points (MCID) to show reduction in disability and improvement in functionality. (4-5 weeks)        Goal Note       Less neck tightness, no headaches past two days

## 2024-09-05 ENCOUNTER — TREATMENT (OUTPATIENT)
Dept: PHYSICAL THERAPY | Facility: CLINIC | Age: 44
End: 2024-09-05
Payer: COMMERCIAL

## 2024-09-05 DIAGNOSIS — G44.229 CHRONIC TENSION HEADACHE: ICD-10-CM

## 2024-09-05 DIAGNOSIS — M43.6 NECK RIGID: ICD-10-CM

## 2024-09-05 DIAGNOSIS — M54.2 NECK PAIN: ICD-10-CM

## 2024-09-05 PROCEDURE — 97110 THERAPEUTIC EXERCISES: CPT | Mod: GP

## 2024-09-05 PROCEDURE — 97140 MANUAL THERAPY 1/> REGIONS: CPT | Mod: GP

## 2024-09-05 ASSESSMENT — PAIN - FUNCTIONAL ASSESSMENT: PAIN_FUNCTIONAL_ASSESSMENT: 0-10

## 2024-09-05 ASSESSMENT — PAIN SCALES - GENERAL: PAINLEVEL_OUTOF10: 3

## 2024-09-05 NOTE — PROGRESS NOTES
"Physical Therapy Treatment    Patient Name: Katharine Lewis  MRN: [id number]  : 1980   Anabelle Mcgrath  Today's Date: 2024  Time Calculation  Start Time: 1130  Stop Time: 1212  Time Calculation (min): 42 min  PT Therapeutic Procedures Time Entry  Manual Therapy Time Entry: 25  Therapeutic Exercise Time Entry: 15           General  Reason for Referral: neck pain  Referred By: Anabelle Mcgrath  General Comment: Visit  POC  Visit #10     Current Problem  Problem List Items Addressed This Visit             ICD-10-CM       Neuro    Chronic tension headache G44.229     Other Visit Diagnoses         Codes    Neck pain     M54.2    Neck rigid     M43.6                 Subjective   Pt reports that her pain today is mostly central on C7. Pt states that her headaches have decreased in terms of pain and frequency. Pt has been able to complete HEP without issues and feels that they have improved headaches.     Pt. Reports compliance with HEP.    Precautions  Precautions  Precautions Comment: HTN    Pain  Pain Assessment: 0-10  0-10 (Numeric) Pain Score: 3  Pain Location: Neck  Pain Orientation: Mid    Objective   Pt with increased tissue tension along R UT, L levator, and AKIL cervical and throacic paraspinals           Last Session:  UBE 3' fwd 3' bwd, Lv-1  Resisted rows magenta tube 2x10  B shoulder extension magenta tube 2x10  B shoulder ER Mint green 2x10  Seated GH Horizontal abd Mint green 2x10   Seated GH Diagonal abd Mint green 2x10 each way  Supine Cervical rotation x10 5\" hold Bilat (N)  Seated Chin Tucks x10 3\" hold (X)  Seated UT stretch 2 x 20 (X)  Treatments:    Therapeutic exercise  Open Books R/L x10 (HEP)  Cervical flexion with rotation R/L x10  Supine on half foam roll shoulder flexion x10  Supine on half foam roll snow angles 2x10  Supine on half foam roll HABD x10  R/L UT stretch x30\"  R/L Levator stretch x30\"  AKIL Shoulder ER Mint band 2x10 (HEP)        Manual   Pt informed consent given prior to " intervention. 6 40 mm needles in and 6 needles out to L UT, AKIL Cervical paraspinals and thoracic paraspinals, R Levator with pistoning and fanning technique used. Pt with no adverse affects post session.         Plan for next visit: DN/STM as needed, Neck ROM, Thoracic ROM, scapular strengthening    Current HEP:    Access Code: [email or web address]  URL: [email or web address]  Date: 08/01/2024  Prepared by: Herminio Blancasler     Exercises  - Seated Cervical Retraction  - 1 x daily - 7 x weekly - 3 sets - 10 reps - 3s hold  - Seated Gentle Upper Trapezius Stretch  - 1 x daily - 7 x weekly - 3 sets - 10 reps - 30s hold  - Gentle Levator Scapulae Stretch  - 1 x daily - 7 x weekly - 3 sets - 10 reps - 30s hold  - Seated Scapular Retraction  - 1 x daily - 7 x weekly - 3 sets - 10 reps  - Shoulder External Rotation and Scapular Retraction with Resistance  - 1 x daily - 7 x weekly - 3 sets - 10 reps  - Sidelying Thoracic Rotation with Open Book  - 1 x daily - 7 x weekly - 3 sets - 10 reps  Assessment:     Pt tolerated session well with reported having increased looseness of neck post session. Pt will benefit from continued treatment at this time.     Plan:  OP PT Plan  Treatment/Interventions: Aquatic therapy, Biofeedback, Blood flow restriction therapy, Canalith repositioning, Cryotherapy, Dry needling, Education/ Instruction, Electrical stimulation, Fluidotherapy, Hot pack, Iontophoresis, Laser, Manual therapy, Mechanical traction, Neuromuscular re-education, Taping techniques, Therapeutic activities, Therapeutic exercises, Ultrasound, Vasopneumatic device  PT Plan: Skilled PT  PT Frequency: 2 times per week  Duration: 5 weeks  Onset Date: 06/06/24  Rehab Potential: Good  Plan of Care Agreement: Patient    Goals:  Active       PT Problem       PT Neck Goals (Progressing)       Start:  08/01/24    Expected End:  09/20/24       1. Pt will adhere to and complete HEP in order to improve functionality outside of the  clinic. (2 weeks)    2.   Pt will report 2/10 pain when performing turning head, reading, work tasks and recreational activities in order to improve quality of life and maintain functional independence. (4-5 weeks)    3.   Pt will improve postural awareness in order to reduce reoccurrence of impairments. (2 weeks)    4.   Pt will improve ROM of neck by 10 degrees in order to improve ability to move head in all directions and reduce likelihood of headaches. (3-4 weeks)    5.  Pt goal: Reduce my Headaches    6. Pt will improve Neck Disability Index by 9 points (MCID) to show reduction in disability and improvement in functionality. (4-5 weeks)        Goal Note       Less neck tightness, no headaches past two days

## 2024-09-16 ENCOUNTER — TREATMENT (OUTPATIENT)
Dept: PHYSICAL THERAPY | Facility: CLINIC | Age: 44
End: 2024-09-16
Payer: COMMERCIAL

## 2024-09-16 ENCOUNTER — APPOINTMENT (OUTPATIENT)
Age: 44
End: 2024-09-16
Payer: COMMERCIAL

## 2024-09-16 VITALS
WEIGHT: 250 LBS | BODY MASS INDEX: 39.24 KG/M2 | HEART RATE: 64 BPM | HEIGHT: 67 IN | DIASTOLIC BLOOD PRESSURE: 84 MMHG | SYSTOLIC BLOOD PRESSURE: 138 MMHG

## 2024-09-16 DIAGNOSIS — G44.229 CHRONIC TENSION HEADACHE: ICD-10-CM

## 2024-09-16 DIAGNOSIS — M43.6 NECK RIGID: ICD-10-CM

## 2024-09-16 DIAGNOSIS — I10 PRIMARY HYPERTENSION: ICD-10-CM

## 2024-09-16 DIAGNOSIS — R42 DIZZINESS OF UNKNOWN ETIOLOGY: Primary | ICD-10-CM

## 2024-09-16 DIAGNOSIS — R68.89 DECREASED EXERCISE TOLERANCE: ICD-10-CM

## 2024-09-16 DIAGNOSIS — M54.2 NECK PAIN: ICD-10-CM

## 2024-09-16 PROCEDURE — 3008F BODY MASS INDEX DOCD: CPT | Performed by: FAMILY MEDICINE

## 2024-09-16 PROCEDURE — 3075F SYST BP GE 130 - 139MM HG: CPT | Performed by: FAMILY MEDICINE

## 2024-09-16 PROCEDURE — 97110 THERAPEUTIC EXERCISES: CPT | Mod: GP

## 2024-09-16 PROCEDURE — 1036F TOBACCO NON-USER: CPT | Performed by: FAMILY MEDICINE

## 2024-09-16 PROCEDURE — 99214 OFFICE O/P EST MOD 30 MIN: CPT | Performed by: FAMILY MEDICINE

## 2024-09-16 PROCEDURE — 3079F DIAST BP 80-89 MM HG: CPT | Performed by: FAMILY MEDICINE

## 2024-09-16 RX ORDER — HYDROXYCHLOROQUINE SULFATE 200 MG/1
1 TABLET, FILM COATED ORAL
COMMUNITY
Start: 2024-09-13

## 2024-09-16 ASSESSMENT — PAIN SCALES - GENERAL: PAINLEVEL_OUTOF10: 2

## 2024-09-16 ASSESSMENT — PAIN - FUNCTIONAL ASSESSMENT: PAIN_FUNCTIONAL_ASSESSMENT: 0-10

## 2024-09-16 NOTE — PROGRESS NOTES
"Physical Therapy Treatment    Patient Name: Katharine Lewis  MRN: [id number]  : 1980   Anabelle Mcgrath  Today's Date: 2024  Time Calculation  Start Time: 1035  Stop Time: 1120  Time Calculation (min): 45 min  PT Therapeutic Procedures Time Entry  Therapeutic Exercise Time Entry: 38           General  Reason for Referral: neck pain  Referred By: Anabelle Mcgrath  General Comment: Visit 10/15 POC  Visit #11     Current Problem  Problem List Items Addressed This Visit             ICD-10-CM       Neuro    Chronic tension headache G44.229     Other Visit Diagnoses         Codes    Neck pain     M54.2    Neck rigid     M43.6                 Subjective   Pt reports that she has been having a headache for the past 3 days and that just her L side of her neck has been feeling tight.  Pt has been able to complete HEP without issue at this time.     Pt. Reports compliance with HEP.    Precautions  Precautions  Precautions Comment: HTN    Pain  Pain Assessment: 0-10  0-10 (Numeric) Pain Score: 2  Pain Location: Head  Pain Orientation: Right, Left    Objective   Forward Bendin (80-90)  Backward Bendin (70)  Side Bending:  Left 35   Right 35 (35)  Rotation: Left 83  Right 75 (80-90)         Last Session:  Open Books R/L x10 (HEP)  Cervical flexion with rotation R/L x10  Supine on half foam roll shoulder flexion x10  Supine on half foam roll snow angles 2x10  Supine on half foam roll HABD x10  R/L UT stretch x30\"  R/L Levator stretch x30\"  AKIL Shoulder ER Mint band 2x10 (HEP)  Pt informed consent given prior to intervention. 6 40 mm needles in and 6 needles out to L UT, AKIL Cervical paraspinals and thoracic paraspinals, R Levator with pistoning and fanning technique used. Pt with no adverse affects post session.        Treatments:    Therapeutic exercise  Seated UT stretch x30\"  Seated Levator stretch x30\"   Cervical Flexion with rotation R/L x15  R/L Open books x10  Mid rows with Magenta tube 2x15 (P reps)  R/L " "Shoulder depression with blue band 2x10 (New)  Doorway pec stretch R/L 2x30\" (HEP)  R/L Ball on wall flex/ext, Lat, clockwise, counterclockwise x30\" each (New)        Manual   Pt informed consent given prior to intervention. 2 60 mm needles in and 3 30 mm needles in,  5 needles out to L Levator, Rhomboid, cervical paraspinals and suboccipitals with pistoning and fanning technique used. Pt with no adverse affects post session.         Plan for next visit: DN as needed, Neck ROM, Scapular strengthening, RTC strengthening    Current HEP:    Access Code: [email or web address]  URL: [email or web address]  Date: 08/01/2024  Prepared by: Herminio Blancasler     Exercises  - Seated Cervical Retraction  - 1 x daily - 7 x weekly - 3 sets - 10 reps - 3s hold  - Seated Gentle Upper Trapezius Stretch  - 1 x daily - 7 x weekly - 3 sets - 10 reps - 30s hold  - Gentle Levator Scapulae Stretch  - 1 x daily - 7 x weekly - 3 sets - 10 reps - 30s hold  - Seated Scapular Retraction  - 1 x daily - 7 x weekly - 3 sets - 10 reps  - Shoulder External Rotation and Scapular Retraction with Resistance  - 1 x daily - 7 x weekly - 3 sets - 10 reps  - Sidelying Thoracic Rotation with Open Book  - 1 x daily - 7 x weekly - 3 sets - 10 reps  - Single Arm Doorway Pec Stretch at 90 Degrees Abduction  - 1 x daily - 7 x weekly - 3 sets - 1 reps - 30s hold      Assessment:     Pt tolerated session well with no reported headache post session, pt with some minor neck tightness that radiated to L shoulder. Pt will benefit from continued treatment.     Plan:  OP PT Plan  Treatment/Interventions: Aquatic therapy, Biofeedback, Blood flow restriction therapy, Canalith repositioning, Cryotherapy, Dry needling, Education/ Instruction, Electrical stimulation, Fluidotherapy, Hot pack, Iontophoresis, Laser, Manual therapy, Mechanical traction, Neuromuscular re-education, Taping techniques, Therapeutic activities, Therapeutic exercises, Ultrasound, Vasopneumatic " device  PT Plan: Skilled PT  PT Frequency: 1 time per week  Duration: 4 weeks  Onset Date: 06/06/24  Rehab Potential: Good  Plan of Care Agreement: Patient    Goals:  Active       PT Problem       PT Neck Goals (Progressing)       Start:  08/01/24    Expected End:  09/20/24       1. Pt will adhere to and complete HEP in order to improve functionality outside of the clinic. (2 weeks)    2.   Pt will report 2/10 pain when performing turning head, reading, work tasks and recreational activities in order to improve quality of life and maintain functional independence. (4-5 weeks)    3.   Pt will improve postural awareness in order to reduce reoccurrence of impairments. (2 weeks)    4.   Pt will improve ROM of neck by 10 degrees in order to improve ability to move head in all directions and reduce likelihood of headaches. (3-4 weeks)    5.  Pt goal: Reduce my Headaches    6. Pt will improve Neck Disability Index by 9 points (MCID) to show reduction in disability and improvement in functionality. (4-5 weeks)        Goal Note       Less neck tightness, no headaches past two days

## 2024-09-16 NOTE — PROGRESS NOTES
Subjective  Katharine eLwis is a 44 y.o. female who presents for Follow-up.  HPI  Here for follow up, overall better.  ON lexapro 10 mg , is helping  PT is helping headaches  Has been to rheum and is on plaquenil, borderline enrique.  The only symptom she has left is pre syncope with exertion, gets so dizzy she feels like she's going to pass out, going up stairs, weight lifting, carrying in groceries.  Echo, holter, labs okay.  Recommend we ask cardiologist opinion here.      Review of Systems   All other systems reviewed and are negative.  .    No Known Allergies    Current Outpatient Medications on File Prior to Visit   Medication Sig Dispense Refill    atorvastatin (Lipitor) 40 mg tablet TAKE 1 TABLET BY MOUTH AT  BEDTIME 90 tablet 3    cholecalciferol (Vitamin D-3) 25 MCG (1000 UT) tablet Take by mouth.      escitalopram (Lexapro) 10 mg tablet Take 1 tablet (10 mg) by mouth once daily. 30 tablet 2    hydroxychloroquine (Plaquenil) 200 mg tablet Take 1 tablet (200 mg) by mouth every 12 hours.      losartan (Cozaar) 100 mg tablet TAKE 1 TABLET BY MOUTH DAILY 90 tablet 3     No current facility-administered medications on file prior to visit.       Patient Active Problem List   Diagnosis    Other hyperlipidemia    Elevated glucose    Hypertension    Migraine without aura, intractable    Sleep apnea    Varicose veins of legs    Chronic tension headache       Objective     Visit Vitals  /84 (BP Location: Left arm, Patient Position: Sitting)   Pulse 64      Physical Exam  Vitals and nursing note reviewed.   Constitutional:       General: She is not in acute distress.     Appearance: Normal appearance. She is not toxic-appearing.   HENT:      Head: Normocephalic and atraumatic.   Pulmonary:      Effort: Pulmonary effort is normal.   Musculoskeletal:      Cervical back: Neck supple. No rigidity.      Comments:     Skin:     General: Skin is warm and dry.   Neurological:      General: No focal deficit present.       Mental Status: She is alert and oriented to person, place, and time.   Psychiatric:         Mood and Affect: Mood normal.         Behavior: Behavior normal.         Assessment/Plan   Problem List Items Addressed This Visit       Hypertension    Relevant Orders    CBC and Auto Differential    Comprehensive Metabolic Panel    Lipid Panel    TSH with reflex to Free T4 if abnormal    Hemoglobin A1C    Vitamin B12     Other Visit Diagnoses       Dizziness of unknown etiology    -  Primary    Relevant Orders    Referral to Cardiology    Decreased exercise tolerance        Relevant Orders    Referral to Cardiology               Routine follow up six months, labs prior, call concerns.     Anabelle Mcgrath MD

## 2024-09-20 NOTE — PROGRESS NOTES
[facility]  Cardiology Clinic Visit Note    This is a 44 y.o. female former smoker with a history of hypertension, hyperlipidemia, CRISTOFER with CPAP noncompliance, anxiety, and inflammatory polyarthropathy who presents to the clinic referred by PCP Dr. Anabelle Mcgrath for pre-syncope with exertion.      She has been experiencing episodes of dizziness lightheadedness and extreme weakness and fatigue with any physical exertion since June of this year.  For example she will be walking with two 5 gallon buckets for her horse when she has to stop and lay down because she feels she is going to pass out.  Other activities that triggers episodes is walking up stairs and moving nadira of hay.  She does not get the symptoms when lifting weights for exercise. She has not passed out. Since December 2023 she has undergone major lifestyle changes including regular exercise with aerobic and weight training, she eats 3 meals a day high-protein and complex carbohydrate with lots of fruits and vegetables.  She drinks around 120 fluid ounces of water each day.     Active Issues  Pre-syncope  -Echo 7/18/2024 shows normal biventricular systolic function, LVEF 59%, and no hemodynamically significant valvular disease.   -30 Day RENETTA ending on 8/16/2024 showed average HR of 71 bpm, predominant sinus rhythm, PVC and PAC burden <1%, no atrial or ventriclar arrhthymias, AV block, or pauses.  -Lyme disease PCR negative, CBC unremarkable. TSH normal, Serum creat 1.16, increased from baseline. Positive VAISHNAVI.     Hyperlipidemia, mixed  - on 6/2024  -High-intensity statin    Hypertension  -losartan 100 mg daily    Past Medical History  Past Medical History:   Diagnosis Date    Anemia, unspecified 12/09/2021    Mild anemia    Essential (primary) hypertension 12/09/2022    Hypertension    Migraine without aura, intractable, without status migrainosus 09/09/2021    Migraine without aura, intractable    Personal history of other  endocrine, nutritional and metabolic disease 2021    History of vitamin D deficiency       Past Surgical History  Past Surgical History:   Procedure Laterality Date    TUBAL LIGATION         Medications  Current Outpatient Medications   Medication Instructions    atorvastatin (Lipitor) 40 mg tablet TAKE 1 TABLET BY MOUTH AT  BEDTIME    cholecalciferol (Vitamin D-3) 25 MCG (1000 UT) tablet oral    escitalopram (LEXAPRO) 10 mg, oral, Daily    hydroxychloroquine (Plaquenil) 200 mg tablet 1 tablet, oral, Every 12 hours scheduled (0630,1830)    losartan (Cozaar) 100 mg tablet TAKE 1 TABLET BY MOUTH DAILY       Allergies  No Known Allergies    Social History  Social History     Tobacco Use    Smoking status: Former     Current packs/day: 0.00     Types: Cigarettes     Quit date:      Years since quittin.    Smokeless tobacco: Never   Vaping Use    Vaping status: Never Used   Substance Use Topics    Alcohol use: Not Currently    Drug use: Not Currently     Types: Marijuana     Comment: In her 20s       Family History  Family History   Problem Relation Name Age of Onset    Hypertension Father      Breast cancer Mother's Sister      Breast cancer Mother's Sister      Breast cancer Mother's Sister      Breast cancer Maternal Grandmother  55    Ovarian cancer Maternal Grandmother      Colon cancer Maternal Grandmother      Bone cancer Maternal Grandmother      Other (irregular heartbeat) Maternal Grandmother      Other (pacemaker) Maternal Grandmother      Stroke Paternal Grandfather         VITALS  Vitals:    24 0851   BP: 100/68   Pulse: 86   SpO2: 97%       Weight  Vitals:    24 0851   Weight: 114 kg (250 lb 6.4 oz)       PHYSICAL EXAM  Physical Exam  Vitals and nursing note reviewed.   Constitutional:       General: She is not in acute distress.     Appearance: She is obese.   HENT:      Head: Normocephalic and atraumatic.      Mouth/Throat:      Mouth: Mucous membranes are moist.      Pharynx:  Oropharynx is clear.   Eyes:      General: No scleral icterus.     Pupils: Pupils are equal, round, and reactive to light.   Cardiovascular:      Rate and Rhythm: Normal rate and regular rhythm.      Pulses: Normal pulses.      Heart sounds: Normal heart sounds, S1 normal and S2 normal. No murmur heard.     No friction rub.   Pulmonary:      Effort: Pulmonary effort is normal.      Breath sounds: Normal breath sounds.   Abdominal:      General: Bowel sounds are normal. There is no distension.      Palpations: Abdomen is soft.      Tenderness: There is no abdominal tenderness.   Musculoskeletal:         General: No swelling. Normal range of motion.      Cervical back: Normal range of motion and neck supple.      Right lower leg: No edema.      Left lower leg: No edema.   Skin:     General: Skin is warm and dry.      Capillary Refill: Capillary refill takes less than 2 seconds.      Findings: No rash.   Neurological:      General: No focal deficit present.      Mental Status: She is alert.   Psychiatric:         Mood and Affect: Mood normal.         Behavior: Behavior normal.         Cardiovascular Labs  Lab Results   Component Value Date    HGB 11.9 (L) 06/17/2024    HGB 12.0 01/05/2024    HGB 12.0 06/23/2023    HGB 12.0 05/31/2022    HGB 12.4 12/06/2021     06/17/2024    WBC 7.0 06/17/2024     06/17/2024    K 4.5 06/17/2024    CREATININE 1.16 (H) 08/01/2024    CREATININE 0.71 06/17/2024    CREATININE 0.96 01/05/2024    BUN 17 08/01/2024    CALCIUM 9.1 06/17/2024    LDLF 51 06/23/2023       Assessment and Plan  The 10-year ASCVD risk score (Angelica AVELAR, et al., 2019) is: 1.3%    Values used to calculate the score:      Age: 44 years      Sex: Female      Is Non- : No      Diabetic: No      Tobacco smoker: No      Systolic Blood Pressure: 138 mmHg      Is BP treated: Yes      HDL Cholesterol: 49 mg/dL      Total Cholesterol: 207 mg/dL  Low Risk: <5%  Borderline Risk:  5%-7.4%  Intermediate Risk: 7.5% - 19.9%  High Risk: >20%    -Overall her cardiac exam is unremarkable.  Her episodes may be vasovagal in nature.  I encouraged her to wear compression stockings and see if that helps.  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  She has seen rheumatologist who has a very low suspicion for lupus but is suspicious for inflammatory disease.  Her echocardiogram and 30-day CardioNet monitor are all normal and unremarkable. I will request she undergo a exercise stress test as one final cardiac test.  If her stress test is normal I recommend that she seek evaluation with functional medicine such as [facility] with Dr. Sydney Vogel in [place] for hormonal and inflammatory testing.     Return to Care:  Will call with test results and schedule follow-up as needed.    If your symptoms worsen or progress please go directory to your nearest emergency department for evaluation.     Thank you for this interesting clinical case and allowing me to participate in the care of this patient. Please reach me out if you have any questions or if you need any clarifications regarding this patient's care.    **Disclaimer: This note was dictated by speech recognition, and every effort has been made to prevent any error in transcription, however minor errors may be present**  ___________________________________________________  Carson Rawls, MSN, CNP, ACNPC, CCRN  Advanced Practice Provider, Nurse Practitioner  Division of Cardiovascular Medicine  [facility]  [facility]

## 2024-09-23 ENCOUNTER — APPOINTMENT (OUTPATIENT)
Dept: CARDIOLOGY | Facility: CLINIC | Age: 44
End: 2024-09-23
Payer: COMMERCIAL

## 2024-09-23 ENCOUNTER — TREATMENT (OUTPATIENT)
Dept: PHYSICAL THERAPY | Facility: CLINIC | Age: 44
End: 2024-09-23
Payer: COMMERCIAL

## 2024-09-23 VITALS
HEART RATE: 86 BPM | DIASTOLIC BLOOD PRESSURE: 68 MMHG | OXYGEN SATURATION: 97 % | WEIGHT: 250.4 LBS | SYSTOLIC BLOOD PRESSURE: 100 MMHG | BODY MASS INDEX: 39.22 KG/M2

## 2024-09-23 DIAGNOSIS — E78.2 MIXED HYPERLIPIDEMIA: ICD-10-CM

## 2024-09-23 DIAGNOSIS — R68.89 DECREASED EXERCISE TOLERANCE: ICD-10-CM

## 2024-09-23 DIAGNOSIS — I10 PRIMARY HYPERTENSION: ICD-10-CM

## 2024-09-23 DIAGNOSIS — R42 DIZZINESS OF UNKNOWN ETIOLOGY: ICD-10-CM

## 2024-09-23 DIAGNOSIS — R55 PRE-SYNCOPE: Primary | ICD-10-CM

## 2024-09-23 DIAGNOSIS — M43.6 NECK RIGID: ICD-10-CM

## 2024-09-23 DIAGNOSIS — M54.2 NECK PAIN: ICD-10-CM

## 2024-09-23 DIAGNOSIS — G44.229 CHRONIC TENSION HEADACHE: ICD-10-CM

## 2024-09-23 PROCEDURE — 97140 MANUAL THERAPY 1/> REGIONS: CPT | Mod: GP,CQ

## 2024-09-23 PROCEDURE — 99204 OFFICE O/P NEW MOD 45 MIN: CPT

## 2024-09-23 PROCEDURE — 3078F DIAST BP <80 MM HG: CPT

## 2024-09-23 PROCEDURE — 3074F SYST BP LT 130 MM HG: CPT

## 2024-09-23 PROCEDURE — 1036F TOBACCO NON-USER: CPT

## 2024-09-23 PROCEDURE — 97110 THERAPEUTIC EXERCISES: CPT | Mod: GP,CQ

## 2024-09-23 ASSESSMENT — PAIN - FUNCTIONAL ASSESSMENT: PAIN_FUNCTIONAL_ASSESSMENT: 0-10

## 2024-09-23 ASSESSMENT — PAIN SCALES - GENERAL: PAINLEVEL_OUTOF10: 3

## 2024-09-23 NOTE — PROGRESS NOTES
"Physical Therapy Treatment    Patient Name: Katharine Lewis  MRN: [id number]  Today's Date: 9/23/2024  Time Calculation  Start Time: 1045  Stop Time: 1128  Time Calculation (min): 43 min  PT Therapeutic Procedures Time Entry  Manual Therapy Time Entry: 10  Therapeutic Exercise Time Entry: 31         Current Problem  Problem List Items Addressed This Visit             ICD-10-CM    Chronic tension headache G44.229     Other Visit Diagnoses         Codes    Neck pain     M54.2    Neck rigid     M43.6            Subjective   Pt.'s name and birthday confirmed.  Pt. Is compliant with HEP.  Pt. Reports HA and cervical/scapular tightness this date.       Reason for Referral: neck pain  Referred By: Anabelle Mcgrath  General Comment: Visit 11/15 POC      Precautions  Precautions  Precautions Comment: HTN      Pain  Pain Assessment: 0-10  0-10 (Numeric) Pain Score: 3  Pain Location: Head  Pain Orientation: Right, Left    Objective:  Treatments:  Therapeutic exercise  Seated UT stretch x30\"  Seated Levator stretch x30\"   Cervical Flexion with rotation R/L x15  R/L Open books x10  Mid rows with Magenta tube 2x15  R/L Shoulder depression with blue band 2x10   Doorway pec stretch R/L 2x30\" (HEP)   R/L Ball on wall flex/ext, Lat, clockwise, counterclockwise x30\" each   Shld ER green band 2 x10 (N)  Shld HABD green band 2 x10 (N)           Manual   Pt informed consent given prior to intervention. 2 60 mm needles in and 3 30 mm needles in,  5 needles out to L Levator, Rhomboid, cervical paraspinals and suboccipitals with pistoning and fanning technique used. Pt with no adverse affects post session. (X)  STM to mid scapular area, UT's, cervical paraspinals x10 mins (N)           Plan for next visit: DN as needed, Neck ROM, Scapular strengthening, RTC strengthening           OP EDUCATION:    Access Code: [email or web address]  URL: [email or web address]  Date: 08/01/2024  Prepared by: Herminio Hoffmann     Exercises  - Seated Cervical " Retraction  - 1 x daily - 7 x weekly - 3 sets - 10 reps - 3s hold  - Seated Gentle Upper Trapezius Stretch  - 1 x daily - 7 x weekly - 3 sets - 10 reps - 30s hold  - Gentle Levator Scapulae Stretch  - 1 x daily - 7 x weekly - 3 sets - 10 reps - 30s hold  - Seated Scapular Retraction  - 1 x daily - 7 x weekly - 3 sets - 10 reps  - Shoulder External Rotation and Scapular Retraction with Resistance  - 1 x daily - 7 x weekly - 3 sets - 10 reps  - Sidelying Thoracic Rotation with Open Book  - 1 x daily - 7 x weekly - 3 sets - 10 reps  - Single Arm Doorway Pec Stretch at 90 Degrees Abduction  - 1 x daily - 7 x weekly - 3 sets - 1 reps - 30s hold          Assessment:  Fair tolerance with TE and stretches.  No c/o increased sx.'s noted.  Added scapular sx. Which patient felt challenged with.  STM to affected areas with moderate tightness noted bilaterally.         Plan:  Continue with strengthening, ROM and flexibility per tolerance to improve daily activities/work duties with little to no difficulty.  MB       OP PT Plan  Treatment/Interventions: Aquatic therapy, Biofeedback, Blood flow restriction therapy, Canalith repositioning, Cryotherapy, Dry needling, Education/ Instruction, Electrical stimulation, Fluidotherapy, Hot pack, Iontophoresis, Laser, Manual therapy, Mechanical traction, Neuromuscular re-education, Taping techniques, Therapeutic activities, Therapeutic exercises, Ultrasound, Vasopneumatic device  PT Plan: Skilled PT  PT Frequency: 1 time per week  Duration: 4 weeks  Onset Date: 06/06/24  Rehab Potential: Good  Plan of Care Agreement: Patient              Goals:  Active       PT Problem       PT Neck Goals (Progressing)       Start:  08/01/24    Expected End:  09/20/24       1. Pt will adhere to and complete HEP in order to improve functionality outside of the clinic. (2 weeks)    2.   Pt will report 2/10 pain when performing turning head, reading, work tasks and recreational activities in order to improve  quality of life and maintain functional independence. (4-5 weeks)    3.   Pt will improve postural awareness in order to reduce reoccurrence of impairments. (2 weeks)    4.   Pt will improve ROM of neck by 10 degrees in order to improve ability to move head in all directions and reduce likelihood of headaches. (3-4 weeks)    5.  Pt goal: Reduce my Headaches    6. Pt will improve Neck Disability Index by 9 points (MCID) to show reduction in disability and improvement in functionality. (4-5 weeks)        Goal Note       Less neck tightness, no headaches past two days

## 2024-09-27 ENCOUNTER — HOSPITAL ENCOUNTER (EMERGENCY)
Facility: HOSPITAL | Age: 44
Discharge: HOME | End: 2024-09-28
Attending: EMERGENCY MEDICINE
Payer: COMMERCIAL

## 2024-09-27 VITALS
HEART RATE: 62 BPM | DIASTOLIC BLOOD PRESSURE: 90 MMHG | OXYGEN SATURATION: 96 % | HEIGHT: 69 IN | SYSTOLIC BLOOD PRESSURE: 143 MMHG | RESPIRATION RATE: 16 BRPM | WEIGHT: 239 LBS | BODY MASS INDEX: 35.4 KG/M2 | TEMPERATURE: 97.8 F

## 2024-09-27 DIAGNOSIS — T50.905A ADVERSE EFFECT OF DRUG, INITIAL ENCOUNTER: Primary | ICD-10-CM

## 2024-09-27 LAB
ALBUMIN SERPL BCP-MCNC: 3.8 G/DL (ref 3.4–5)
ALP SERPL-CCNC: 87 U/L (ref 33–110)
ALT SERPL W P-5'-P-CCNC: 17 U/L (ref 7–45)
ANION GAP SERPL CALC-SCNC: 10 MMOL/L (ref 10–20)
APPEARANCE UR: CLEAR
AST SERPL W P-5'-P-CCNC: 13 U/L (ref 9–39)
BASOPHILS # BLD AUTO: 0.05 X10*3/UL (ref 0–0.1)
BASOPHILS NFR BLD AUTO: 0.5 %
BILIRUB SERPL-MCNC: 0.2 MG/DL (ref 0–1.2)
BILIRUB UR STRIP.AUTO-MCNC: NEGATIVE MG/DL
BUN SERPL-MCNC: 19 MG/DL (ref 6–23)
CALCIUM SERPL-MCNC: 9 MG/DL (ref 8.6–10.3)
CHLORIDE SERPL-SCNC: 107 MMOL/L (ref 98–107)
CO2 SERPL-SCNC: 24 MMOL/L (ref 21–32)
COLOR UR: COLORLESS
CREAT SERPL-MCNC: 0.93 MG/DL (ref 0.5–1.05)
EGFRCR SERPLBLD CKD-EPI 2021: 79 ML/MIN/1.73M*2
EOSINOPHIL # BLD AUTO: 0.23 X10*3/UL (ref 0–0.7)
EOSINOPHIL NFR BLD AUTO: 2.3 %
ERYTHROCYTE [DISTWIDTH] IN BLOOD BY AUTOMATED COUNT: 14.4 % (ref 11.5–14.5)
GLUCOSE SERPL-MCNC: 106 MG/DL (ref 74–99)
GLUCOSE UR STRIP.AUTO-MCNC: NORMAL MG/DL
HCT VFR BLD AUTO: 37.5 % (ref 36–46)
HGB BLD-MCNC: 11.5 G/DL (ref 12–16)
IMM GRANULOCYTES # BLD AUTO: 0.03 X10*3/UL (ref 0–0.7)
IMM GRANULOCYTES NFR BLD AUTO: 0.3 % (ref 0–0.9)
KETONES UR STRIP.AUTO-MCNC: NEGATIVE MG/DL
LACTATE SERPL-SCNC: 0.8 MMOL/L (ref 0.4–2)
LEUKOCYTE ESTERASE UR QL STRIP.AUTO: NEGATIVE
LYMPHOCYTES # BLD AUTO: 2.35 X10*3/UL (ref 1.2–4.8)
LYMPHOCYTES NFR BLD AUTO: 23.2 %
MCH RBC QN AUTO: 26.2 PG (ref 26–34)
MCHC RBC AUTO-ENTMCNC: 30.7 G/DL (ref 32–36)
MCV RBC AUTO: 85 FL (ref 80–100)
MONOCYTES # BLD AUTO: 0.99 X10*3/UL (ref 0.1–1)
MONOCYTES NFR BLD AUTO: 9.8 %
NEUTROPHILS # BLD AUTO: 6.49 X10*3/UL (ref 1.2–7.7)
NEUTROPHILS NFR BLD AUTO: 63.9 %
NITRITE UR QL STRIP.AUTO: NEGATIVE
NRBC BLD-RTO: 0 /100 WBCS (ref 0–0)
PH UR STRIP.AUTO: 6 [PH]
PLATELET # BLD AUTO: 339 X10*3/UL (ref 150–450)
POTASSIUM SERPL-SCNC: 3.6 MMOL/L (ref 3.5–5.3)
PROT SERPL-MCNC: 6.6 G/DL (ref 6.4–8.2)
PROT UR STRIP.AUTO-MCNC: NEGATIVE MG/DL
RBC # BLD AUTO: 4.39 X10*6/UL (ref 4–5.2)
RBC # UR STRIP.AUTO: NEGATIVE /UL
SODIUM SERPL-SCNC: 137 MMOL/L (ref 136–145)
SP GR UR STRIP.AUTO: 1.01
UROBILINOGEN UR STRIP.AUTO-MCNC: NORMAL MG/DL
WBC # BLD AUTO: 10.1 X10*3/UL (ref 4.4–11.3)

## 2024-09-27 PROCEDURE — 96372 THER/PROPH/DIAG INJ SC/IM: CPT | Performed by: EMERGENCY MEDICINE

## 2024-09-27 PROCEDURE — 99283 EMERGENCY DEPT VISIT LOW MDM: CPT

## 2024-09-27 PROCEDURE — 36415 COLL VENOUS BLD VENIPUNCTURE: CPT | Performed by: EMERGENCY MEDICINE

## 2024-09-27 PROCEDURE — 2500000004 HC RX 250 GENERAL PHARMACY W/ HCPCS (ALT 636 FOR OP/ED): Performed by: EMERGENCY MEDICINE

## 2024-09-27 PROCEDURE — 80053 COMPREHEN METABOLIC PANEL: CPT | Performed by: EMERGENCY MEDICINE

## 2024-09-27 PROCEDURE — 81003 URINALYSIS AUTO W/O SCOPE: CPT | Performed by: EMERGENCY MEDICINE

## 2024-09-27 PROCEDURE — 83605 ASSAY OF LACTIC ACID: CPT | Performed by: EMERGENCY MEDICINE

## 2024-09-27 PROCEDURE — 85025 COMPLETE CBC W/AUTO DIFF WBC: CPT | Performed by: EMERGENCY MEDICINE

## 2024-09-27 ASSESSMENT — COLUMBIA-SUICIDE SEVERITY RATING SCALE - C-SSRS
2. HAVE YOU ACTUALLY HAD ANY THOUGHTS OF KILLING YOURSELF?: NO
1. IN THE PAST MONTH, HAVE YOU WISHED YOU WERE DEAD OR WISHED YOU COULD GO TO SLEEP AND NOT WAKE UP?: NO
6. HAVE YOU EVER DONE ANYTHING, STARTED TO DO ANYTHING, OR PREPARED TO DO ANYTHING TO END YOUR LIFE?: NO

## 2024-09-28 LAB
HOLD SPECIMEN: NORMAL

## 2024-09-28 RX ORDER — IBUPROFEN 800 MG/1
800 TABLET ORAL 3 TIMES DAILY
Qty: 21 TABLET | Refills: 0 | Status: SHIPPED | OUTPATIENT
Start: 2024-09-28 | End: 2024-10-05

## 2024-09-28 NOTE — ED PROVIDER NOTES
HPI   Chief Complaint   Patient presents with    Rash     Rash noted to back, side and arms, pt denies any new detergents or medicines. Started just PTA. No meds PTA.        Patient presents with raised erythematous rash that started several hours prior to arrival.  Rash is somewhat pleuritic.  Patient denies any new colognes perfumes soaps or detergents.  Patient denies any shortness of breath.  Patient is complaining of some bilateral posterior flank pain.  The patient started Plaquenil on .  I feel the drug has caused this reaction.  Patient will be given a shot of Solu-Medrol 125 mg IM.  I did go over all the results with the patient.  Patient is improved upon discharge.      History provided by:  Patient          Patient History   Past Medical History:   Diagnosis Date    Anemia, unspecified 2021    Mild anemia    Essential (primary) hypertension 2022    Hypertension    Migraine without aura, intractable, without status migrainosus 2021    Migraine without aura, intractable    Personal history of other endocrine, nutritional and metabolic disease 2021    History of vitamin D deficiency     Past Surgical History:   Procedure Laterality Date    TUBAL LIGATION       Family History   Problem Relation Name Age of Onset    Hypertension Father      Breast cancer Mother's Sister      Breast cancer Mother's Sister      Breast cancer Mother's Sister      Breast cancer Maternal Grandmother  55    Ovarian cancer Maternal Grandmother      Colon cancer Maternal Grandmother      Bone cancer Maternal Grandmother      Other (irregular heartbeat) Maternal Grandmother      Other (pacemaker) Maternal Grandmother      Stroke Paternal Grandfather       Social History     Tobacco Use    Smoking status: Former     Current packs/day: 0.00     Types: Cigarettes     Quit date:      Years since quittin.7    Smokeless tobacco: Never   Vaping Use    Vaping status: Never Used   Substance Use  Topics    Alcohol use: Not Currently    Drug use: Not Currently     Types: Marijuana     Comment: In her 20s       Physical Exam   ED Triage Vitals [09/27/24 2208]   Temperature Heart Rate Respirations BP   36.6 °C (97.8 °F) 60 18 173/62      Pulse Ox Temp Source Heart Rate Source Patient Position   97 % Temporal -- --      BP Location FiO2 (%)     -- --       Physical Exam  Vitals and nursing note reviewed.   Constitutional:       General: She is not in acute distress.     Appearance: She is well-developed.   HENT:      Head: Normocephalic and atraumatic.   Eyes:      Conjunctiva/sclera: Conjunctivae normal.   Cardiovascular:      Rate and Rhythm: Normal rate and regular rhythm.      Heart sounds: No murmur heard.  Pulmonary:      Effort: Pulmonary effort is normal. No respiratory distress.      Breath sounds: Normal breath sounds.   Abdominal:      Palpations: Abdomen is soft.      Tenderness: There is no abdominal tenderness.   Musculoskeletal:         General: No swelling.      Cervical back: Neck supple.   Skin:     General: Skin is warm and dry.      Capillary Refill: Capillary refill takes less than 2 seconds.      Findings: Erythema and rash present.      Comments: Somewhat macular/papular rash on trunk and extremities.   Neurological:      Mental Status: She is alert.   Psychiatric:         Mood and Affect: Mood normal.         Labs Reviewed   URINALYSIS WITH REFLEX CULTURE AND MICROSCOPIC - Abnormal       Result Value    Color, Urine Colorless (*)     Appearance, Urine Clear      Specific Gravity, Urine 1.015      pH, Urine 6.0      Protein, Urine NEGATIVE      Glucose, Urine Normal      Blood, Urine NEGATIVE      Ketones, Urine NEGATIVE      Bilirubin, Urine NEGATIVE      Urobilinogen, Urine Normal      Nitrite, Urine NEGATIVE      Leukocyte Esterase, Urine NEGATIVE     CBC WITH AUTO DIFFERENTIAL - Abnormal    WBC 10.1      nRBC 0.0      RBC 4.39      Hemoglobin 11.5 (*)     Hematocrit 37.5      MCV 85       MCH 26.2      MCHC 30.7 (*)     RDW 14.4      Platelets 339      Neutrophils % 63.9      Immature Granulocytes %, Automated 0.3      Lymphocytes % 23.2      Monocytes % 9.8      Eosinophils % 2.3      Basophils % 0.5      Neutrophils Absolute 6.49      Immature Granulocytes Absolute, Automated 0.03      Lymphocytes Absolute 2.35      Monocytes Absolute 0.99      Eosinophils Absolute 0.23      Basophils Absolute 0.05     COMPREHENSIVE METABOLIC PANEL - Abnormal    Glucose 106 (*)     Sodium 137      Potassium 3.6      Chloride 107      Bicarbonate 24      Anion Gap 10      Urea Nitrogen 19      Creatinine 0.93      eGFR 79      Calcium 9.0      Albumin 3.8      Alkaline Phosphatase 87      Total Protein 6.6      AST 13      Bilirubin, Total 0.2      ALT 17     LACTATE - Normal    Lactate 0.8      Narrative:     Venipuncture immediately after or during the administration of Metamizole may lead to falsely low results. Testing should be performed immediately prior to Metamizole dosing.   URINALYSIS WITH REFLEX CULTURE AND MICROSCOPIC    Narrative:     The following orders were created for panel order Urinalysis with Reflex Culture and Microscopic.  Procedure                               Abnormality         Status                     ---------                               -----------         ------                     Urinalysis with Reflex C...[[id number]]  Abnormal            Final result               Extra Urine Gray Tube[[id number]]                            In process                   Please view results for these tests on the individual orders.   EXTRA URINE GRAY TUBE      ED Course & MDM   Diagnoses as of 09/27/24 2328   Adverse effect of drug, initial encounter                 No data recorded     Joan Coma Scale Score: 15 (09/27/24 2209 : Chito Rod RN)                           Medical Decision Making  1 discontinue Plaquenil 2 take medication as prescribed 3 call rheumatologist for further  guidance if symptoms worsen return to ED        Procedure  Procedures     Felix Levine, DO  09/28/24 0026

## 2024-09-30 ENCOUNTER — HOSPITAL ENCOUNTER (OUTPATIENT)
Dept: CARDIOLOGY | Facility: HOSPITAL | Age: 44
Discharge: HOME | End: 2024-09-30
Payer: COMMERCIAL

## 2024-09-30 DIAGNOSIS — R55 PRE-SYNCOPE: ICD-10-CM

## 2024-09-30 PROCEDURE — 93016 CV STRESS TEST SUPVJ ONLY: CPT | Performed by: STUDENT IN AN ORGANIZED HEALTH CARE EDUCATION/TRAINING PROGRAM

## 2024-09-30 PROCEDURE — 93017 CV STRESS TEST TRACING ONLY: CPT

## 2024-09-30 PROCEDURE — 93018 CV STRESS TEST I&R ONLY: CPT | Performed by: STUDENT IN AN ORGANIZED HEALTH CARE EDUCATION/TRAINING PROGRAM

## 2024-10-01 ENCOUNTER — TELEPHONE (OUTPATIENT)
Dept: CARDIOLOGY | Facility: CLINIC | Age: 44
End: 2024-10-01
Payer: COMMERCIAL

## 2024-10-01 NOTE — TELEPHONE ENCOUNTER
----- Message from Nurse Hanna BUCKLEY sent at 10/1/2024  9:32 AM EDT -----  [email or web address]  ----- Message -----  From: GÓMEZ Anderson  Sent: 10/1/2024   9:24 AM EDT  To: [facility] Clinical Support Staff    Please let her know that her stress test was normal. She did not show any signs of reduced blood flow to the heart. Follow up as needed.

## 2024-10-03 ENCOUNTER — TREATMENT (OUTPATIENT)
Dept: PHYSICAL THERAPY | Facility: CLINIC | Age: 44
End: 2024-10-03
Payer: COMMERCIAL

## 2024-10-03 DIAGNOSIS — G44.229 CHRONIC TENSION HEADACHE: ICD-10-CM

## 2024-10-03 DIAGNOSIS — M43.6 NECK RIGID: ICD-10-CM

## 2024-10-03 DIAGNOSIS — M54.2 NECK PAIN: ICD-10-CM

## 2024-10-03 PROCEDURE — 97110 THERAPEUTIC EXERCISES: CPT | Mod: GP

## 2024-10-03 ASSESSMENT — PAIN SCALES - GENERAL: PAINLEVEL_OUTOF10: 2

## 2024-10-03 ASSESSMENT — PAIN - FUNCTIONAL ASSESSMENT: PAIN_FUNCTIONAL_ASSESSMENT: 0-10

## 2024-10-03 NOTE — PROGRESS NOTES
"Physical Therapy Treatment    Patient Name: Katharine Lewis  MRN: [id number]  : 1980   Anabelle Mcgrath  Today's Date: 10/3/2024  Time Calculation  Start Time: 1043  Stop Time: 1125  Time Calculation (min): 42 min  PT Therapeutic Procedures Time Entry  Therapeutic Exercise Time Entry: 38           General  Reason for Referral: neck pain  Referred By: Anabelle Mcgrath  General Comment: Visit 12/15 POC  Visit #13     Current Problem  Problem List Items Addressed This Visit             ICD-10-CM       Neuro    Chronic tension headache G44.229     Other Visit Diagnoses         Codes    Neck pain     M54.2    Neck rigid     M43.6                 Subjective   Pt reports that she hasn't been having the headaches but is having increased neck stiffness to date. Pt recently had to stop taking anti-inflammatories due to reaction and is on prednisone. Pt has been able to complete HEP without issue.     Pt. Reports compliance with HEP.    Precautions  Precautions  Precautions Comment: HTN    Pain  Pain Assessment: 0-10  0-10 (Numeric) Pain Score: 2  Pain Location: Neck    Objective   Pt with increased tissue tension along L UT, Levator, and suboccipitals.         Last Session:  Seated UT stretch x30\"  Seated Levator stretch x30\"   Cervical Flexion with rotation R/L x15  R/L Open books x10  Mid rows with Magenta tube 2x15  R/L Shoulder depression with blue band 2x10   Doorway pec stretch R/L 2x30\" (HEP)   R/L Ball on wall flex/ext, Lat, clockwise, counterclockwise x30\" each   Shld ER green band 2 x10 (N)  Shld HABD green band 2 x10 (N)  Treatments:    Therapeutic exercise  L/R UT stretch x30\" each  L/R Levator stretch x30\" each  Cervical flexion with rotation R/L 3\" hold x10  Chin tucks with rotation R/L x10  Supine AKIL shoulder flexion on Foam x10  AKIL Snow angles on foam x10  AKIL Hugs on foam x10  Supine SA punches R/L 2x10  Prone rows R/L 2x10   R/L Shoulder depression with Blue band 2x10      Manual   Pt informed consent " given prior to intervention. 1 50 mm needles, 1 60 mm, and 1 30 mm in and 3 needles out to L UT, Levator, Suboccipitals with pistoning and fanning technique used. Pt with no adverse affects post session.         Plan for next visit: Recheck     Current HEP:     Access Code: [email or web address]  URL: [email or web address]  Date: 08/01/2024  Prepared by: Herminio Pegler     Exercises  - Seated Cervical Retraction  - 1 x daily - 7 x weekly - 3 sets - 10 reps - 3s hold  - Seated Gentle Upper Trapezius Stretch  - 1 x daily - 7 x weekly - 3 sets - 10 reps - 30s hold  - Gentle Levator Scapulae Stretch  - 1 x daily - 7 x weekly - 3 sets - 10 reps - 30s hold  - Seated Scapular Retraction  - 1 x daily - 7 x weekly - 3 sets - 10 reps  - Shoulder External Rotation and Scapular Retraction with Resistance  - 1 x daily - 7 x weekly - 3 sets - 10 reps  - Sidelying Thoracic Rotation with Open Book  - 1 x daily - 7 x weekly - 3 sets - 10 reps  - Single Arm Doorway Pec Stretch at 90 Degrees Abduction  - 1 x daily - 7 x weekly - 3 sets - 1 reps - 30s hold  - Prone rows 3x10      Assessment:     Pt tolerated session well with decreased tissue tension and no increase in pain. Pt will be rechecked next session to determine next steps.     Plan:  OP PT Plan  Treatment/Interventions: Aquatic therapy, Biofeedback, Blood flow restriction therapy, Canalith repositioning, Cryotherapy, Dry needling, Education/ Instruction, Electrical stimulation, Fluidotherapy, Hot pack, Iontophoresis, Laser, Manual therapy, Mechanical traction, Neuromuscular re-education, Taping techniques, Therapeutic activities, Therapeutic exercises, Ultrasound, Vasopneumatic device  PT Plan: Skilled PT  PT Frequency: 1 time per week  Duration: 4 weeks  Onset Date: 06/06/24  Rehab Potential: Good  Plan of Care Agreement: Patient    Goals:  Active       PT Problem       PT Neck Goals (Progressing)       Start:  08/01/24    Expected End:  09/20/24       1. Pt will  adhere to and complete HEP in order to improve functionality outside of the clinic. (2 weeks)    2.   Pt will report 2/10 pain when performing turning head, reading, work tasks and recreational activities in order to improve quality of life and maintain functional independence. (4-5 weeks)    3.   Pt will improve postural awareness in order to reduce reoccurrence of impairments. (2 weeks)    4.   Pt will improve ROM of neck by 10 degrees in order to improve ability to move head in all directions and reduce likelihood of headaches. (3-4 weeks)    5.  Pt goal: Reduce my Headaches    6. Pt will improve Neck Disability Index by 9 points (MCID) to show reduction in disability and improvement in functionality. (4-5 weeks)        Goal Note       Less neck tightness, no headaches past two days

## 2024-10-09 DIAGNOSIS — R45.89 FEELING SAD: ICD-10-CM

## 2024-10-09 DIAGNOSIS — R53.83 OTHER FATIGUE: ICD-10-CM

## 2024-10-09 RX ORDER — ESCITALOPRAM OXALATE 10 MG/1
10 TABLET ORAL DAILY
Qty: 90 TABLET | Refills: 1 | Status: SHIPPED | OUTPATIENT
Start: 2024-10-09

## 2024-10-14 ENCOUNTER — APPOINTMENT (OUTPATIENT)
Dept: PHYSICAL THERAPY | Facility: CLINIC | Age: 44
End: 2024-10-14
Payer: COMMERCIAL

## 2024-10-21 ENCOUNTER — TREATMENT (OUTPATIENT)
Dept: PHYSICAL THERAPY | Facility: CLINIC | Age: 44
End: 2024-10-21
Payer: COMMERCIAL

## 2024-10-21 DIAGNOSIS — M43.6 NECK RIGID: ICD-10-CM

## 2024-10-21 DIAGNOSIS — G44.229 CHRONIC TENSION HEADACHE: ICD-10-CM

## 2024-10-21 DIAGNOSIS — G44.221 CHRONIC TENSION-TYPE HEADACHE, INTRACTABLE: Primary | ICD-10-CM

## 2024-10-21 DIAGNOSIS — M54.2 NECK PAIN: ICD-10-CM

## 2024-10-21 PROCEDURE — 97110 THERAPEUTIC EXERCISES: CPT | Mod: GP

## 2024-10-21 ASSESSMENT — PAIN SCALES - GENERAL: PAINLEVEL_OUTOF10: 3

## 2024-10-21 ASSESSMENT — PAIN - FUNCTIONAL ASSESSMENT: PAIN_FUNCTIONAL_ASSESSMENT: 0-10

## 2024-10-21 NOTE — PROGRESS NOTES
"Physical Therapy Treatment    Patient Name: Katharine Lewis  MRN: [id number]  : 1980   Anabelle Mcgrath  Today's Date: 10/21/2024  Time Calculation  Start Time: 945  Stop Time:   Time Calculation (min): 29 min  PT Therapeutic Procedures Time Entry  Therapeutic Exercise Time Entry: 23           General  Reason for Referral: neck pain  Referred By: Anabelle Mcgrath  General Comment: Visit  POC  Visit #14     Current Problem  Problem List Items Addressed This Visit             ICD-10-CM       Neuro    Chronic tension headache - Primary G44.229     Other Visit Diagnoses         Codes    Neck pain     M54.2    Neck rigid     M43.6                 Subjective   Pt reports that she is feeling tight today which she thinks is from doing yard work over the weekend. Pt has been able to complete HEP without any issue at this time. Pt says that they got a headache 2 times last week.     Pt. Reports compliance with HEP.    Precautions  Precautions  Precautions Comment: HTN    Pain  Pain Assessment: 0-10  0-10 (Numeric) Pain Score: 3  Pain Location: Neck    Objective   MMT 5/5 max  LEFT RIGHT   Shoulder Flexion 5 5   Shoulder Abduction 5 5   Shoulder ER 5 5   Shoulder IR 5 5   Elbow Flexion     Elbow Extension     Wrist Flexion     Wrist Extension       Cervical AROM:   Cervical AROM not listed below is WNL    Forward Bendin (80-90)  Backward Bendin (70)  Side Bending:  Left 41  Right 45 (35)  Rotation: Left 93  Right 91 (80-90)       Outcome Measures:  Other Measures  Neck Disability Index: 8        Treatments:    Therapeutic exercise  Recheck   Seated UT stretch x30\"  Seated Levator stretch x30\"      Manual   Pt informed consent given prior to intervention. 4 50 mm needles in and 4 needles out to R/L UT with pistoning and fanning technique used. Pt with no adverse affects post session. Unbilled         Plan for next visit: DN as needed, neck  mobility, scapular strengthening    Current HEP:     Access Code: " [email or web address]  URL: [email or web address]  Date: 08/01/2024  Prepared by: Herminio Pegler     Exercises  - Seated Cervical Retraction  - 1 x daily - 7 x weekly - 3 sets - 10 reps - 3s hold  - Seated Gentle Upper Trapezius Stretch  - 1 x daily - 7 x weekly - 3 sets - 10 reps - 30s hold  - Gentle Levator Scapulae Stretch  - 1 x daily - 7 x weekly - 3 sets - 10 reps - 30s hold  - Seated Scapular Retraction  - 1 x daily - 7 x weekly - 3 sets - 10 reps  - Shoulder External Rotation and Scapular Retraction with Resistance  - 1 x daily - 7 x weekly - 3 sets - 10 reps  - Sidelying Thoracic Rotation with Open Book  - 1 x daily - 7 x weekly - 3 sets - 10 reps  - Single Arm Doorway Pec Stretch at 90 Degrees Abduction  - 1 x daily - 7 x weekly - 3 sets - 1 reps - 30s hold  - Prone rows 3x10    Assessment:     Pt has made improvements in ROM, pain, and function but is still having headaches weekly as well as resting pain. Pt has Mostly met goals at this time. Pt educated on importance of continuing HEP to maintain gains made during therapy and to contact if any questions or concerns arise, Pt verbalizes understanding at this time. Pt will schedule 5 additional visits to address stiffness and headaches for improved quality of life.       Plan:  OP PT Plan  Treatment/Interventions: Aquatic therapy, Biofeedback, Blood flow restriction therapy, Canalith repositioning, Cryotherapy, Dry needling, Education/ Instruction, Electrical stimulation, Fluidotherapy, Hot pack, Iontophoresis, Laser, Manual therapy, Mechanical traction, Neuromuscular re-education, Taping techniques, Therapeutic activities, Therapeutic exercises, Ultrasound, Vasopneumatic device  PT Plan: Skilled PT  PT Frequency: 1 time per week  Duration: 5 weeks  Onset Date: 06/06/24  Rehab Potential: Good  Plan of Care Agreement: Patient    Goals:  Active       PT Problem       PT Neck Goals (Progressing)       Start:  08/01/24    Expected End:  09/20/24        1. Pt will adhere to and complete HEP in order to improve functionality outside of the clinic. (2 weeks) 10/21: Goal met    2.   Pt will report 2/10 pain when performing turning head, reading, work tasks and recreational activities in order to improve quality of life and maintain functional independence. (4-5 weeks) 10/21: Goal not met, pt with 3/10 neck pain at this time    3.   Pt will improve postural awareness in order to reduce reoccurrence of impairments. (2 weeks) 10/21: Goal met    4.   Pt will improve ROM of neck by 10 degrees in order to improve ability to move head in all directions and reduce likelihood of headaches. (3-4 weeks) 10/21: Goal mostly met    5.  Pt goal: Reduce my Headaches    6. Pt will improve Neck Disability Index by 9 points (MCID) to show reduction in disability and improvement in functionality. (4-5 weeks) 10/21: Goal met, pt with improved score of 8 from 19        Goal Note       Less neck tightness, no headaches past two days

## 2024-10-28 ENCOUNTER — TREATMENT (OUTPATIENT)
Dept: PHYSICAL THERAPY | Facility: CLINIC | Age: 44
End: 2024-10-28
Payer: COMMERCIAL

## 2024-10-28 DIAGNOSIS — M54.2 NECK PAIN: ICD-10-CM

## 2024-10-28 DIAGNOSIS — G44.221 CHRONIC TENSION-TYPE HEADACHE, INTRACTABLE: Primary | ICD-10-CM

## 2024-10-28 DIAGNOSIS — M43.6 NECK RIGID: ICD-10-CM

## 2024-10-28 DIAGNOSIS — G44.229 CHRONIC TENSION HEADACHE: ICD-10-CM

## 2024-10-28 PROCEDURE — 97110 THERAPEUTIC EXERCISES: CPT | Mod: GP

## 2024-10-28 ASSESSMENT — PAIN SCALES - GENERAL: PAINLEVEL_OUTOF10: 8

## 2024-10-28 ASSESSMENT — PAIN - FUNCTIONAL ASSESSMENT: PAIN_FUNCTIONAL_ASSESSMENT: 0-10

## 2024-11-04 ENCOUNTER — TREATMENT (OUTPATIENT)
Dept: PHYSICAL THERAPY | Facility: CLINIC | Age: 44
End: 2024-11-04
Payer: COMMERCIAL

## 2024-11-04 DIAGNOSIS — M54.2 NECK PAIN: ICD-10-CM

## 2024-11-04 DIAGNOSIS — M43.6 NECK RIGID: ICD-10-CM

## 2024-11-04 DIAGNOSIS — G44.229 CHRONIC TENSION-TYPE HEADACHE, NOT INTRACTABLE: Primary | ICD-10-CM

## 2024-11-04 DIAGNOSIS — G44.229 CHRONIC TENSION HEADACHE: ICD-10-CM

## 2024-11-04 PROCEDURE — 97110 THERAPEUTIC EXERCISES: CPT | Mod: GP

## 2024-11-04 ASSESSMENT — PAIN SCALES - GENERAL: PAINLEVEL_OUTOF10: 2

## 2024-11-04 ASSESSMENT — PAIN - FUNCTIONAL ASSESSMENT: PAIN_FUNCTIONAL_ASSESSMENT: 0-10

## 2024-11-04 NOTE — PROGRESS NOTES
"Physical Therapy Treatment    Patient Name: Katharine Lewis  MRN: [id number]  : 1980   Anabelle Mcgrath  Today's Date: 2024  Time Calculation  Start Time: 933  Stop Time: 959  Time Calculation (min): 26 min  PT Therapeutic Procedures Time Entry  Therapeutic Exercise Time Entry: 17           General  Reason for Referral: neck pain  Referred By: Anabelle Mcgrath  General Comment: Visit 15/18 POC  Visit #16     Current Problem  Problem List Items Addressed This Visit             ICD-10-CM       Neuro    Chronic tension headache - Primary G44.229     Other Visit Diagnoses         Codes    Neck pain     M54.2    Neck rigid     M43.6                 Subjective   Pt reports just having some stiffness and a slight headache that she is able to tolerate. Pt has been able to complete HEP without any issue. Pt has more of her stiffness on the distal aspects of Upper trap AKIL as well as suboccipitally.      Pt. Reports compliance with HEP.    Precautions  Precautions  Precautions Comment: HTN    Pain  Pain Assessment: 0-10  0-10 (Numeric) Pain Score: 2  Pain Location: Neck    Objective   Pt with increased tissue tension along AKIL UT, Levator, and suboccipitals            Last Session:  Seated R/L UT stretch 2x30\"  Seated R/L Levator stretch 2x30\"  Cervical Flexion R/L x10 3\" hold   Mid rows with purple band x15, with teal band x15  Chin tucks with R/L rotations x10  Chin tucks with flex/ext x10      Manual   Pt informed consent given prior to intervention. 4 50 mm needles in and 1 30 mm in and 5 needles out to R/L UT, Levator, and L suboccipital with pistoning and fanning technique used. Pt with no adverse affects post session. Unbilled   Treatments:    Therapeutic exercise  R/L UT stretch x30\"  R/L Levator stretch x30\"  Cervical flexion with rotation 3\" hold x10  Chin tuck in supine with flexion 5\" hold x10 (HEP)        Manual   Pt informed consent given prior to intervention. 4 50 mm needles and 2 30mm needles in and 6 " "needles out to AKIL UT, levator, and suboccipitals with pistoning and fanning technique used. Pt with no adverse affects post session. Unbilled         Plan for next visit: DN as needed, neck ROM, neck strengthening, Postural strengthening     Current HEP:     Access Code: [email or web address]  URL: [email or web address]  Date: 08/01/2024  Prepared by: Herminio Pegler     Exercises  - Seated Cervical Retraction  - 1 x daily - 7 x weekly - 3 sets - 10 reps - 3s hold  - Seated Gentle Upper Trapezius Stretch  - 1 x daily - 7 x weekly - 3 sets - 10 reps - 30s hold  - Gentle Levator Scapulae Stretch  - 1 x daily - 7 x weekly - 3 sets - 10 reps - 30s hold  - Seated Scapular Retraction  - 1 x daily - 7 x weekly - 3 sets - 10 reps  - Shoulder External Rotation and Scapular Retraction with Resistance  - 1 x daily - 7 x weekly - 3 sets - 10 reps  - Sidelying Thoracic Rotation with Open Book  - 1 x daily - 7 x weekly - 3 sets - 10 reps  - Single Arm Doorway Pec Stretch at 90 Degrees Abduction  - 1 x daily - 7 x weekly - 3 sets - 1 reps - 30s hold  - Prone rows 3x10  - Supine chin tuck with neck flexion 5\" hold 3x10    Assessment:     Pt tolerated session well with no increase in stiffness or pain as well as no worsening of headache. Pt will benefit from continued treatment.     Plan:  OP PT Plan  Treatment/Interventions: Aquatic therapy, Biofeedback, Blood flow restriction therapy, Canalith repositioning, Cryotherapy, Dry needling, Education/ Instruction, Electrical stimulation, Fluidotherapy, Hot pack, Iontophoresis, Laser, Manual therapy, Mechanical traction, Neuromuscular re-education, Taping techniques, Therapeutic activities, Therapeutic exercises, Ultrasound, Vasopneumatic device  PT Plan: Skilled PT  PT Frequency: 1 time per week  Duration: 5 weeks  Onset Date: 06/06/24  Rehab Potential: Good  Plan of Care Agreement: Patient    Goals:  Active       PT Problem       PT Neck Goals (Progressing)       Start:  " 08/01/24    Expected End:  09/20/24       1. Pt will adhere to and complete HEP in order to improve functionality outside of the clinic. (2 weeks) 10/21: Goal met    2.   Pt will report 2/10 pain when performing turning head, reading, work tasks and recreational activities in order to improve quality of life and maintain functional independence. (4-5 weeks) 10/21: Goal not met, pt with 3/10 neck pain at this time    3.   Pt will improve postural awareness in order to reduce reoccurrence of impairments. (2 weeks) 10/21: Goal met    4.   Pt will improve ROM of neck by 10 degrees in order to improve ability to move head in all directions and reduce likelihood of headaches. (3-4 weeks) 10/21: Goal mostly met    5.  Pt goal: Reduce my Headaches    6. Pt will improve Neck Disability Index by 9 points (MCID) to show reduction in disability and improvement in functionality. (4-5 weeks) 10/21: Goal met, pt with improved score of 8 from 19        Goal Note       Less neck tightness, no headaches past two days

## 2024-11-11 ENCOUNTER — TREATMENT (OUTPATIENT)
Dept: PHYSICAL THERAPY | Facility: CLINIC | Age: 44
End: 2024-11-11
Payer: COMMERCIAL

## 2024-11-11 DIAGNOSIS — M54.2 NECK PAIN: ICD-10-CM

## 2024-11-11 DIAGNOSIS — M43.6 NECK RIGID: ICD-10-CM

## 2024-11-11 DIAGNOSIS — G44.229 CHRONIC TENSION HEADACHE: ICD-10-CM

## 2024-11-11 PROCEDURE — 97110 THERAPEUTIC EXERCISES: CPT | Mod: GP

## 2024-11-11 ASSESSMENT — PAIN - FUNCTIONAL ASSESSMENT: PAIN_FUNCTIONAL_ASSESSMENT: 0-10

## 2024-11-11 ASSESSMENT — PAIN SCALES - GENERAL: PAINLEVEL_OUTOF10: 0 - NO PAIN

## 2024-11-11 NOTE — PROGRESS NOTES
"Physical Therapy Treatment    Patient Name: aKtharine Lewis  MRN: [id number]  : 1980   Anabelle Mcgrath  Today's Date: 2024  Time Calculation  Start Time: 928  Stop Time: 958  Time Calculation (min): 30 min  PT Therapeutic Procedures Time Entry  Therapeutic Exercise Time Entry: 23           General  Reason for Referral: neck pain  Referred By: Anabelle Mcgrath  General Comment: Visit  POC  Visit #17     Current Problem  Problem List Items Addressed This Visit             ICD-10-CM       Neuro    Chronic tension headache G44.229     Other Visit Diagnoses         Codes    Neck pain     M54.2    Neck rigid     M43.6                 Subjective   Pt denies any pain in neck or headaches and is just having some stiffness. Pt denies any headaches over the past week. Pt has been able to complete HEP without issue.     Pt. Reports compliance with HEP.    Precautions  Precautions  Precautions Comment: HTN    Pain  Pain Assessment: 0-10  0-10 (Numeric) Pain Score: 0 - No pain  Pain Location: Neck    Objective   Increased tissue tension to AKIL distal UT, levator, cervical paraspinals            Last Session:  Therapeutic exercise  R/L UT stretch x30\"  R/L Levator stretch x30\"  Cervical flexion with rotation 3\" hold x10  Chin tuck in supine with flexion 5\" hold x10 (HEP)        Manual   Pt informed consent given prior to intervention. 4 50 mm needles and 2 30mm needles in and 6 needles out to AKIL UT, levator, and suboccipitals with pistoning and fanning technique used. Pt with no adverse affects post session. Unbilled   Treatments:    Therapeutic exercise  L/R UT stretch 2x30\"  L/R Levator stretch 2x30\"  Cervical flexion with R/L rotation 3\" hold x10  Supine chin tuck with flexion 5\" hold 2x10      Manual   Pt informed consent given prior to intervention. 4 50 mm needles and 2 30mm in and 6 needles out to AKIL UT, levator, and cervical paraspinals with pistoning and fanning technique used. Pt with no adverse affects " "post session. Unbilled         Plan for next visit: DN as needed, cervical ROM, neck strengthening, scapular strengthening     Current HEP:     Access Code: [email or web address]  URL: [email or web address]  Date: 08/01/2024  Prepared by: Herminio Hoffmann     Exercises  - Seated Cervical Retraction  - 1 x daily - 7 x weekly - 3 sets - 10 reps - 3s hold  - Seated Gentle Upper Trapezius Stretch  - 1 x daily - 7 x weekly - 3 sets - 10 reps - 30s hold  - Gentle Levator Scapulae Stretch  - 1 x daily - 7 x weekly - 3 sets - 10 reps - 30s hold  - Seated Scapular Retraction  - 1 x daily - 7 x weekly - 3 sets - 10 reps  - Shoulder External Rotation and Scapular Retraction with Resistance  - 1 x daily - 7 x weekly - 3 sets - 10 reps  - Sidelying Thoracic Rotation with Open Book  - 1 x daily - 7 x weekly - 3 sets - 10 reps  - Single Arm Doorway Pec Stretch at 90 Degrees Abduction  - 1 x daily - 7 x weekly - 3 sets - 1 reps - 30s hold  - Prone rows 3x10  - Supine chin tuck with neck flexion 5\" hold 3x10    Assessment:     Pt tolerated session well with reduced tightness post session. Pt with improved form with supine chin tucks and flexion and only muscle fatigue. Pt will benefit from continued treatment.     Plan:  OP PT Plan  Treatment/Interventions: Aquatic therapy, Biofeedback, Blood flow restriction therapy, Canalith repositioning, Cryotherapy, Dry needling, Education/ Instruction, Electrical stimulation, Fluidotherapy, Hot pack, Iontophoresis, Laser, Manual therapy, Mechanical traction, Neuromuscular re-education, Taping techniques, Therapeutic activities, Therapeutic exercises, Ultrasound, Vasopneumatic device  PT Plan: Skilled PT  PT Frequency: 1 time per week  Duration: 5 weeks  Onset Date: 06/06/24  Rehab Potential: Good  Plan of Care Agreement: Patient    Goals:  Active       PT Problem       PT Neck Goals (Progressing)       Start:  08/01/24    Expected End:  09/20/24       1. Pt will adhere to and complete " HEP in order to improve functionality outside of the clinic. (2 weeks) 10/21: Goal met    2.   Pt will report 2/10 pain when performing turning head, reading, work tasks and recreational activities in order to improve quality of life and maintain functional independence. (4-5 weeks) 10/21: Goal not met, pt with 3/10 neck pain at this time    3.   Pt will improve postural awareness in order to reduce reoccurrence of impairments. (2 weeks) 10/21: Goal met    4.   Pt will improve ROM of neck by 10 degrees in order to improve ability to move head in all directions and reduce likelihood of headaches. (3-4 weeks) 10/21: Goal mostly met    5.  Pt goal: Reduce my Headaches    6. Pt will improve Neck Disability Index by 9 points (MCID) to show reduction in disability and improvement in functionality. (4-5 weeks) 10/21: Goal met, pt with improved score of 8 from 19        Goal Note       Less neck tightness, no headaches past two days

## 2024-11-18 ENCOUNTER — TREATMENT (OUTPATIENT)
Dept: PHYSICAL THERAPY | Facility: CLINIC | Age: 44
End: 2024-11-18
Payer: COMMERCIAL

## 2024-11-18 DIAGNOSIS — G44.229 CHRONIC TENSION HEADACHE: ICD-10-CM

## 2024-11-18 DIAGNOSIS — M54.2 NECK PAIN: ICD-10-CM

## 2024-11-18 DIAGNOSIS — M43.6 NECK RIGID: ICD-10-CM

## 2024-11-18 DIAGNOSIS — G44.229 CHRONIC TENSION-TYPE HEADACHE, NOT INTRACTABLE: Primary | ICD-10-CM

## 2024-11-18 PROCEDURE — 97110 THERAPEUTIC EXERCISES: CPT | Mod: GP

## 2024-11-18 ASSESSMENT — PAIN - FUNCTIONAL ASSESSMENT: PAIN_FUNCTIONAL_ASSESSMENT: 0-10

## 2024-11-18 ASSESSMENT — PAIN SCALES - GENERAL: PAINLEVEL_OUTOF10: 2

## 2024-11-18 NOTE — PROGRESS NOTES
"Physical Therapy Treatment    Patient Name: Katharine Lewis  MRN: [id number]  : 1980   Anabelle Mcgrath  Today's Date: 2024  Time Calculation  Start Time: 925  Stop Time: 954  Time Calculation (min): 29 min  PT Therapeutic Procedures Time Entry  Therapeutic Exercise Time Entry: 23           General  Reason for Referral: neck pain  Referred By: Anabelle Mcgrath  General Comment: Visit  POC  Visit #18     Current Problem  Problem List Items Addressed This Visit             ICD-10-CM       Neuro    Chronic tension headache - Primary G44.229     Other Visit Diagnoses         Codes    Neck pain     M54.2    Neck rigid     M43.6                 Subjective   Pt reports that she feels like she has a knot on the L side of the base of her skull which caused her to have a headache yesterday. Pt has been able to complete HEP without any issues.     Pt. Reports compliance with HEP.    Precautions  Precautions  Precautions Comment: HTN    Pain  Pain Assessment: 0-10  0-10 (Numeric) Pain Score: 2  Pain Location: Neck  Pain Orientation: Left    Objective   Pt with increased tissue tension to L cervical paraspinals          Last Session:  Therapeutic exercise  L/R UT stretch 2x30\"  L/R Levator stretch 2x30\"  Cervical flexion with R/L rotation 3\" hold x10  Supine chin tuck with flexion 5\" hold 2x10      Manual   Pt informed consent given prior to intervention. 4 50 mm needles and 2 30mm in and 6 needles out to AKIL UT, levator, and cervical paraspinals with pistoning and fanning technique used. Pt with no adverse affects post session. Unbilled   Treatments:    Therapeutic exercise  UT stretch R/L x30\"  Levator stretch R/L x30\"  Cervical flexion with rotation R/L x10  Chin tuck with rotation 3\" hold R/L   x10  High rows with pink band 2x15  R/L Prone Y's 2x10      Manual   Pt informed consent given prior to intervention. 2 30 mm needles in and 2 needles out to L cervical paraspinals with pistoning and fanning technique " "used. Pt with no adverse affects post session.         Plan for next visit: Recheck     Current HEP:     Access Code: [email or web address]  URL: [email or web address]  Date: 08/01/2024  Prepared by: Herminio Hoffmann     Exercises  - Seated Cervical Retraction  - 1 x daily - 7 x weekly - 3 sets - 10 reps - 3s hold  - Seated Gentle Upper Trapezius Stretch  - 1 x daily - 7 x weekly - 3 sets - 10 reps - 30s hold  - Gentle Levator Scapulae Stretch  - 1 x daily - 7 x weekly - 3 sets - 10 reps - 30s hold  - Seated Scapular Retraction  - 1 x daily - 7 x weekly - 3 sets - 10 reps  - Shoulder External Rotation and Scapular Retraction with Resistance  - 1 x daily - 7 x weekly - 3 sets - 10 reps  - Sidelying Thoracic Rotation with Open Book  - 1 x daily - 7 x weekly - 3 sets - 10 reps  - Single Arm Doorway Pec Stretch at 90 Degrees Abduction  - 1 x daily - 7 x weekly - 3 sets - 1 reps - 30s hold  - Prone rows 3x10  - Supine chin tuck with neck flexion 5\" hold 3x10    Assessment:     Pt tolerated session well with reported decreased cervical muscle tension post session. Pt will be rechecked next session to determine next steps.     Plan:  OP PT Plan  Treatment/Interventions: Aquatic therapy, Biofeedback, Blood flow restriction therapy, Canalith repositioning, Cryotherapy, Dry needling, Education/ Instruction, Electrical stimulation, Fluidotherapy, Hot pack, Iontophoresis, Laser, Manual therapy, Mechanical traction, Neuromuscular re-education, Taping techniques, Therapeutic activities, Therapeutic exercises, Ultrasound, Vasopneumatic device  PT Plan: Skilled PT  PT Frequency: 1 time per week  Duration: 5 weeks  Onset Date: 06/06/24  Rehab Potential: Good  Plan of Care Agreement: Patient    Goals:  Active       PT Problem       PT Neck Goals (Progressing)       Start:  08/01/24    Expected End:  09/20/24       1. Pt will adhere to and complete HEP in order to improve functionality outside of the clinic. (2 weeks) 10/21: " Goal met    2.   Pt will report 2/10 pain when performing turning head, reading, work tasks and recreational activities in order to improve quality of life and maintain functional independence. (4-5 weeks) 10/21: Goal not met, pt with 3/10 neck pain at this time    3.   Pt will improve postural awareness in order to reduce reoccurrence of impairments. (2 weeks) 10/21: Goal met    4.   Pt will improve ROM of neck by 10 degrees in order to improve ability to move head in all directions and reduce likelihood of headaches. (3-4 weeks) 10/21: Goal mostly met    5.  Pt goal: Reduce my Headaches    6. Pt will improve Neck Disability Index by 9 points (MCID) to show reduction in disability and improvement in functionality. (4-5 weeks) 10/21: Goal met, pt with improved score of 8 from 19        Goal Note       Less neck tightness, no headaches past two days

## 2024-11-25 ENCOUNTER — TREATMENT (OUTPATIENT)
Dept: PHYSICAL THERAPY | Facility: CLINIC | Age: 44
End: 2024-11-25
Payer: COMMERCIAL

## 2024-11-25 DIAGNOSIS — M54.2 NECK PAIN: ICD-10-CM

## 2024-11-25 DIAGNOSIS — G44.229 CHRONIC TENSION HEADACHE: ICD-10-CM

## 2024-11-25 DIAGNOSIS — M43.6 NECK RIGID: ICD-10-CM

## 2024-11-25 DIAGNOSIS — G44.229 CHRONIC TENSION-TYPE HEADACHE, NOT INTRACTABLE: Primary | ICD-10-CM

## 2024-11-25 PROCEDURE — 97110 THERAPEUTIC EXERCISES: CPT | Mod: GP

## 2024-11-25 ASSESSMENT — PAIN - FUNCTIONAL ASSESSMENT: PAIN_FUNCTIONAL_ASSESSMENT: 0-10

## 2024-11-25 ASSESSMENT — PAIN SCALES - GENERAL: PAINLEVEL_OUTOF10: 0 - NO PAIN

## 2024-11-25 NOTE — PROGRESS NOTES
Physical Therapy Treatment    Patient Name: Katharine Lewis  MRN: [id number]  : 1980   Anabelle Mcgrath  Today's Date: 2024  Time Calculation  Start Time: 926  Stop Time: 951  Time Calculation (min): 25 min  PT Therapeutic Procedures Time Entry  Therapeutic Exercise Time Entry: 25           General  Reason for Referral: neck pain  Referred By: Anabelle Mcgrath  General Comment: Visit  POC  Visit #19     Current Problem  Problem List Items Addressed This Visit             ICD-10-CM       Neuro    Chronic tension headache - Primary G44.229     Other Visit Diagnoses         Codes    Neck pain     M54.2    Neck rigid     M43.6                 Subjective   Pt reports no neck stiffness today as well as no headaches. Pt reports that she woke up with some slight stiffness but did some of her HEP and heat and felt ok after.     Pt. Reports compliance with HEP.    Precautions  Precautions  Precautions Comment: HTN    Pain  Pain Assessment: 0-10  0-10 (Numeric) Pain Score: 0 - No pain  Pain Location: Neck    Objective   MMT 5/5 max  LEFT RIGHT   Shoulder Flexion 5 5   Shoulder Abduction 5 5   Shoulder ER 5 5   Shoulder IR 5 5   Elbow Flexion     Elbow Extension     Wrist Flexion     Wrist Extension       Cervical AROM:   Cervical AROM not listed below is WNL    Forward Bendin (80-90)  Backward Bendin (70)  Side Bending:  Left 47  Right 43 (35)  Rotation: Left 92  Right 90 (80-90)       Outcome Measures:  Other Measures  Neck Disability Index: 6        Treatments:    Therapeutic exercise  UBE 3' fwd/bkw (HEP and Tissue extensibility)  Recheck      Current HEP:     Access Code: [email or web address]  URL: [email or web address]  Date: 2024  Prepared by: Herminio Hoffmann     Exercises  - Seated Cervical Retraction  - 1 x daily - 7 x weekly - 3 sets - 10 reps - 3s hold  - Seated Gentle Upper Trapezius Stretch  - 1 x daily - 7 x weekly - 3 sets - 10 reps - 30s hold  - Gentle Levator Scapulae  "Stretch  - 1 x daily - 7 x weekly - 3 sets - 10 reps - 30s hold  - Seated Scapular Retraction  - 1 x daily - 7 x weekly - 3 sets - 10 reps  - Shoulder External Rotation and Scapular Retraction with Resistance  - 1 x daily - 7 x weekly - 3 sets - 10 reps  - Sidelying Thoracic Rotation with Open Book  - 1 x daily - 7 x weekly - 3 sets - 10 reps  - Single Arm Doorway Pec Stretch at 90 Degrees Abduction  - 1 x daily - 7 x weekly - 3 sets - 1 reps - 30s hold  - Prone rows 3x10  - Supine chin tuck with neck flexion 5\" hold 3x10    Assessment:     Pt has made improvements in neck ROM, headaches, pain, and overall functional mobility as evidenced by outcome measure. Pt has Met goals at this time. Pt educated on importance of continuing HEP to maintain gains made during therapy and to contact if any questions or concerns arise, Pt verbalizes understanding at this time. This note will serve a discharge note if pt does not schedule any additional visits before the new year, pt will follow up with dry needling and MD as needed, pt in agreement.       Plan:  OP PT Plan  Treatment/Interventions: Aquatic therapy, Biofeedback, Blood flow restriction therapy, Canalith repositioning, Cryotherapy, Dry needling, Education/ Instruction, Electrical stimulation, Fluidotherapy, Hot pack, Iontophoresis, Laser, Manual therapy, Mechanical traction, Neuromuscular re-education, Taping techniques, Therapeutic activities, Therapeutic exercises, Ultrasound, Vasopneumatic device  PT Plan: Skilled PT  PT Frequency: One time visit  Onset Date: 06/06/24  Rehab Potential: Good  Plan of Care Agreement: Patient    Goals:  Active       PT Problem       PT Neck Goals (Progressing)       Start:  08/01/24    Expected End:  09/20/24       1. Pt will adhere to and complete HEP in order to improve functionality outside of the clinic. (2 weeks) 10/21: Goal met    2.   Pt will report 2/10 pain when performing turning head, reading, work tasks and recreational " activities in order to improve quality of life and maintain functional independence. (4-5 weeks) 10/21: Goal not met, pt with 3/10 neck pain at this time, 11/25: Goal met, pt able to complete daily activities without pain    3.   Pt will improve postural awareness in order to reduce reoccurrence of impairments. (2 weeks) 10/21: Goal met    4.   Pt will improve ROM of neck by 10 degrees in order to improve ability to move head in all directions and reduce likelihood of headaches. (3-4 weeks) 10/21: Goal mostly met, 11/25: Goal met, pt with no ROM limitations     5.  Pt goal: Reduce my Headaches    6. Pt will improve Neck Disability Index by 9 points (MCID) to show reduction in disability and improvement in functionality. (4-5 weeks) 10/21: Goal met, pt with improved score of 8 from 19, 11/25: Score improved from 8 to 6        Goal Note       Less neck tightness, no headaches past two days

## 2024-12-02 ENCOUNTER — HOSPITAL ENCOUNTER (OUTPATIENT)
Age: 44
Discharge: HOME | End: 2024-12-02
Payer: COMMERCIAL

## 2024-12-02 DIAGNOSIS — R76.8: ICD-10-CM

## 2024-12-02 DIAGNOSIS — M06.4: Primary | ICD-10-CM

## 2024-12-02 DIAGNOSIS — Z79.899: ICD-10-CM

## 2024-12-02 LAB
ALANINE AMINOTRANSFER ALT/SGPT: 27 U/L (ref 13–56)
ALBUMIN SERPL-MCNC: 3.6 G/DL (ref 3.2–5)
ALKALINE PHOSPHATASE: 93 U/L (ref 45–117)
ANION GAP: 6 (ref 5–15)
AST(SGOT): 23 U/L (ref 15–37)
BUN SERPL-MCNC: 17 MG/DL (ref 7–18)
BUN/CREAT RATIO: 18.8 RATIO (ref 10–20)
CALCIUM SERPL-MCNC: 8.9 MG/DL (ref 8.5–10.1)
CARBON DIOXIDE: 26 MMOL/L (ref 21–32)
CHLORIDE: 106 MMOL/L (ref 98–107)
DEPRECATED RDW RBC: 45.4 FL (ref 35.1–43.9)
ERYTHROCYTE [DISTWIDTH] IN BLOOD: 14.7 % (ref 11.6–14.6)
EST GLOM FILT RATE - AFR AMER: 88 ML/MIN (ref 60–?)
GLOBULIN: 3.8 G/DL (ref 2.2–4.2)
GLUCOSE: 83 MG/DL (ref 74–106)
HCT VFR BLD AUTO: 37.7 % (ref 37–47)
HEMOGLOBIN: 11.7 G/DL (ref 12–15)
HGB BLD-MCNC: 11.7 G/DL (ref 12–15)
IMMATURE GRANULOCYTES COUNT: 0.01 X10^3/UL (ref 0–0)
MCV RBC: 84.7 FL (ref 81–99)
MEAN CORP HGB CONC: 31 G/DL (ref 32–36)
MEAN PLATELET VOL.: 9.3 FL (ref 6.2–12)
NRBC FLAGGED BY ANALYZER: 0 % (ref 0–5)
PLATELET # BLD: 335 K/MM3 (ref 150–450)
PLATELET COUNT: 335 K/MM3 (ref 150–450)
POTASSIUM: 3.5 MMOL/L (ref 3.5–5.1)
RBC # BLD AUTO: 4.45 M/MM3 (ref 4.2–5.4)
RBC DISTRIBUTION WIDTH CV: 14.7 % (ref 11.6–14.6)
RBC DISTRIBUTION WIDTH SD: 45.4 FL (ref 35.1–43.9)
WBC # BLD AUTO: 8 K/MM3 (ref 4.4–11)
WHITE BLOOD COUNT: 8 K/MM3 (ref 4.4–11)

## 2024-12-02 PROCEDURE — 80053 COMPREHEN METABOLIC PANEL: CPT

## 2024-12-02 PROCEDURE — 36415 COLL VENOUS BLD VENIPUNCTURE: CPT

## 2024-12-02 PROCEDURE — 85025 COMPLETE CBC W/AUTO DIFF WBC: CPT

## 2025-03-11 ENCOUNTER — HOSPITAL ENCOUNTER (OUTPATIENT)
Dept: HOSPITAL 100 - MTLAB | Age: 45
Discharge: HOME | End: 2025-03-11
Payer: COMMERCIAL

## 2025-03-11 DIAGNOSIS — R76.8: ICD-10-CM

## 2025-03-11 DIAGNOSIS — M06.4: Primary | ICD-10-CM

## 2025-03-11 DIAGNOSIS — Z79.899: ICD-10-CM

## 2025-03-11 LAB
ALANINE AMINOTRANSFER ALT/SGPT: 27 U/L (ref ?–34)
ALBUMIN SERPL-MCNC: 3.7 G/DL (ref 3.5–5)
ALKALINE PHOSPHATASE: 98 U/L (ref 35–104)
ANION GAP: 12 (ref 5–15)
AST(SGOT): 25 U/L (ref ?–31)
BUN SERPL-MCNC: 17 MG/DL (ref 4–19)
BUN/CREAT RATIO: 21 RATIO (ref 10–20)
CALCIUM SERPL-MCNC: 8.7 MG/DL (ref 7.6–11)
CARBON DIOXIDE: 21.7 MMOL/L (ref 21–32)
CHLORIDE: 103 MMOL/L (ref 98–108)
DEPRECATED RDW RBC: 48.4 FL (ref 35.1–43.9)
ERYTHROCYTE [DISTWIDTH] IN BLOOD: 15.4 % (ref 11.6–14.6)
GLOBULIN: 3.1 G/DL (ref 2.2–4.2)
GLUCOSE: 84 MG/DL (ref 70–99)
HCT VFR BLD AUTO: 37.3 % (ref 37–47)
HEMOGLOBIN: 11.9 G/DL (ref 12–15)
HGB BLD-MCNC: 11.9 G/DL (ref 12–15)
IMMATURE GRANULOCYTES COUNT: 0.02 X10^3/UL (ref 0–0)
MCV RBC: 86.9 FL (ref 81–99)
MEAN CORP HGB CONC: 31.9 G/DL (ref 32–36)
MEAN PLATELET VOL.: 9.3 FL (ref 6.2–12)
NRBC FLAGGED BY ANALYZER: 0 % (ref 0–5)
PLATELET # BLD: 281 K/MM3 (ref 150–450)
PLATELET COUNT: 281 K/MM3 (ref 150–450)
POTASSIUM: 3.8 MMOL/L (ref 3.3–5.1)
RBC # BLD AUTO: 4.29 M/MM3 (ref 4.2–5.4)
RBC DISTRIBUTION WIDTH CV: 15.4 % (ref 11.6–14.6)
RBC DISTRIBUTION WIDTH SD: 48.4 FL (ref 35.1–43.9)
WBC # BLD AUTO: 7.4 K/MM3 (ref 4.4–11)
WHITE BLOOD COUNT: 7.4 K/MM3 (ref 4.4–11)

## 2025-03-11 PROCEDURE — 85025 COMPLETE CBC W/AUTO DIFF WBC: CPT

## 2025-03-11 PROCEDURE — 36415 COLL VENOUS BLD VENIPUNCTURE: CPT

## 2025-03-11 PROCEDURE — 80053 COMPREHEN METABOLIC PANEL: CPT

## 2025-03-15 LAB
ALBUMIN SERPL-MCNC: 3.7 G/DL (ref 3.6–5.1)
ALP SERPL-CCNC: 85 U/L (ref 31–125)
ALT SERPL-CCNC: 20 U/L (ref 6–29)
ANION GAP SERPL CALCULATED.4IONS-SCNC: 6 MMOL/L (CALC) (ref 7–17)
AST SERPL-CCNC: 15 U/L (ref 10–30)
BASOPHILS # BLD AUTO: 41 CELLS/UL (ref 0–200)
BASOPHILS NFR BLD AUTO: 0.6 %
BILIRUB SERPL-MCNC: 0.4 MG/DL (ref 0.2–1.2)
BUN SERPL-MCNC: 14 MG/DL (ref 7–25)
CALCIUM SERPL-MCNC: 8.6 MG/DL (ref 8.6–10.2)
CHLORIDE SERPL-SCNC: 105 MMOL/L (ref 98–110)
CHOLEST SERPL-MCNC: 141 MG/DL
CHOLEST/HDLC SERPL: 2.3 (CALC)
CO2 SERPL-SCNC: 28 MMOL/L (ref 20–32)
CREAT SERPL-MCNC: 0.76 MG/DL (ref 0.5–0.99)
EGFRCR SERPLBLD CKD-EPI 2021: 100 ML/MIN/1.73M2
EOSINOPHIL # BLD AUTO: 129 CELLS/UL (ref 15–500)
EOSINOPHIL NFR BLD AUTO: 1.9 %
ERYTHROCYTE [DISTWIDTH] IN BLOOD BY AUTOMATED COUNT: 15.4 % (ref 11–15)
EST. AVERAGE GLUCOSE BLD GHB EST-MCNC: 103 MG/DL
EST. AVERAGE GLUCOSE BLD GHB EST-SCNC: 5.7 MMOL/L
GLUCOSE SERPL-MCNC: 94 MG/DL (ref 65–99)
HBA1C MFR BLD: 5.2 % OF TOTAL HGB
HCT VFR BLD AUTO: 39.2 % (ref 35–45)
HDLC SERPL-MCNC: 62 MG/DL
HGB BLD-MCNC: 12.6 G/DL (ref 11.7–15.5)
LDLC SERPL CALC-MCNC: 56 MG/DL (CALC)
LYMPHOCYTES # BLD AUTO: 2196 CELLS/UL (ref 850–3900)
LYMPHOCYTES NFR BLD AUTO: 32.3 %
MCH RBC QN AUTO: 28.1 PG (ref 27–33)
MCHC RBC AUTO-ENTMCNC: 32.1 G/DL (ref 32–36)
MCV RBC AUTO: 87.5 FL (ref 80–100)
MONOCYTES # BLD AUTO: 517 CELLS/UL (ref 200–950)
MONOCYTES NFR BLD AUTO: 7.6 %
NEUTROPHILS # BLD AUTO: 3917 CELLS/UL (ref 1500–7800)
NEUTROPHILS NFR BLD AUTO: 57.6 %
NONHDLC SERPL-MCNC: 79 MG/DL (CALC)
PLATELET # BLD AUTO: 284 THOUSAND/UL (ref 140–400)
PMV BLD REES-ECKER: 9.3 FL (ref 7.5–12.5)
POTASSIUM SERPL-SCNC: 4.4 MMOL/L (ref 3.5–5.3)
PROT SERPL-MCNC: 6.3 G/DL (ref 6.1–8.1)
RBC # BLD AUTO: 4.48 MILLION/UL (ref 3.8–5.1)
SODIUM SERPL-SCNC: 139 MMOL/L (ref 135–146)
TRIGL SERPL-MCNC: 148 MG/DL
TSH SERPL-ACNC: 1.96 MIU/L
VIT B12 SERPL-MCNC: 364 PG/ML (ref 200–1100)
WBC # BLD AUTO: 6.8 THOUSAND/UL (ref 3.8–10.8)

## 2025-03-17 ENCOUNTER — APPOINTMENT (OUTPATIENT)
Age: 45
End: 2025-03-17
Payer: COMMERCIAL

## 2025-03-17 VITALS
SYSTOLIC BLOOD PRESSURE: 136 MMHG | HEART RATE: 76 BPM | WEIGHT: 278 LBS | DIASTOLIC BLOOD PRESSURE: 82 MMHG | BODY MASS INDEX: 41.18 KG/M2 | HEIGHT: 69 IN

## 2025-03-17 DIAGNOSIS — I10 PRIMARY HYPERTENSION: ICD-10-CM

## 2025-03-17 DIAGNOSIS — R73.09 ELEVATED GLUCOSE: ICD-10-CM

## 2025-03-17 DIAGNOSIS — E78.49 OTHER HYPERLIPIDEMIA: ICD-10-CM

## 2025-03-17 DIAGNOSIS — Z12.31 SCREENING MAMMOGRAM FOR BREAST CANCER: Primary | ICD-10-CM

## 2025-03-17 PROCEDURE — 3079F DIAST BP 80-89 MM HG: CPT | Performed by: FAMILY MEDICINE

## 2025-03-17 PROCEDURE — 3075F SYST BP GE 130 - 139MM HG: CPT | Performed by: FAMILY MEDICINE

## 2025-03-17 PROCEDURE — 1036F TOBACCO NON-USER: CPT | Performed by: FAMILY MEDICINE

## 2025-03-17 PROCEDURE — 99214 OFFICE O/P EST MOD 30 MIN: CPT | Performed by: FAMILY MEDICINE

## 2025-03-17 PROCEDURE — 3008F BODY MASS INDEX DOCD: CPT | Performed by: FAMILY MEDICINE

## 2025-03-17 RX ORDER — PREDNISONE 10 MG/1
TABLET ORAL
COMMUNITY
Start: 2024-10-01

## 2025-03-17 RX ORDER — METHOTREXATE 2.5 MG/1
2.5 TABLET ORAL
COMMUNITY
Start: 2024-12-02

## 2025-03-17 RX ORDER — MELATONIN 3 MG
50 TABLET ORAL DAILY
COMMUNITY

## 2025-03-17 RX ORDER — MAGNESIUM 250 MG
TABLET ORAL
COMMUNITY

## 2025-03-17 RX ORDER — ESTRADIOL 2 MG/1
1.5 TABLET ORAL
COMMUNITY
Start: 2025-02-12

## 2025-03-17 RX ORDER — PROGESTERONE 200 MG/1
200 CAPSULE ORAL NIGHTLY
COMMUNITY
Start: 2025-02-12

## 2025-03-17 RX ORDER — FOLIC ACID 1 MG/1
2 TABLET ORAL
COMMUNITY
Start: 2025-01-08

## 2025-03-17 NOTE — PROGRESS NOTES
Patient presents for periodic surveillance of chronic medical problems.     Subjective  Katharine Lewis is a 44 y.o. female who presents for Annual Exam.  HPI  Htn, stable  Elevated glucose by history, normal A1c  Hyperlipdemia, stable on statin  Inflammatory polyarthropathy, sees Dr Savage rheum  On HRT through gyn now  Due for mammogram.   Has been to cardiology for exertional dizziness, had stress test that came back okay.  Review of Systems   All other systems reviewed and are negative.  .    No Known Allergies    Current Outpatient Medications on File Prior to Visit   Medication Sig Dispense Refill    atorvastatin (Lipitor) 40 mg tablet TAKE 1 TABLET BY MOUTH AT  BEDTIME 90 tablet 3    cholecalciferol (Vitamin D-3) 25 MCG (1000 UT) tablet Take by mouth.      dehydroepiandrosterone (DHEA) 25 mg tablet Take 2 tablets (50 mg) by mouth once daily.      escitalopram (Lexapro) 10 mg tablet Take 1 tablet by mouth once daily 90 tablet 1    estradiol (Estrace) 2 mg tablet Take 1.5 tablets (3 mg) by mouth early in the morning..      folic acid (Folvite) 1 mg tablet Take 2 tablets (2 mg) by mouth early in the morning..      losartan (Cozaar) 100 mg tablet TAKE 1 TABLET BY MOUTH DAILY 90 tablet 3    magnesium 250 mg tablet Take by mouth.      methotrexate (Trexall) 2.5 mg tablet 1 tablet (2.5 mg total).      predniSONE (Deltasone) 10 mg tablet TAKE 1 TABLET BY MOUTH ONCE DAILY AS NEEDED FOR 3 TO 5 DAYS WITH FLARE      progesterone (Prometrium) 200 mg capsule Take 1 capsule (200 mg) by mouth once daily at bedtime.      VITAMIN K2 ORAL Take by mouth.      [DISCONTINUED] hydroxychloroquine (Plaquenil) 200 mg tablet Take 1 tablet (200 mg) by mouth every 12 hours.       No current facility-administered medications on file prior to visit.       Patient Active Problem List   Diagnosis    Other hyperlipidemia    Elevated glucose    Hypertension    Migraine without aura, intractable    Sleep apnea    Varicose veins of legs     Chronic tension headache       Objective     Visit Vitals  /82 Comment: left arm seated large cuff   Pulse 76      Physical Exam  Vitals and nursing note reviewed.   Constitutional:       General: She is not in acute distress.     Appearance: Normal appearance. She is not toxic-appearing.   HENT:      Head: Normocephalic and atraumatic.   Cardiovascular:      Rate and Rhythm: Normal rate and regular rhythm.      Heart sounds: No murmur heard.  Pulmonary:      Effort: Pulmonary effort is normal.      Breath sounds: Normal breath sounds.   Musculoskeletal:      Cervical back: Neck supple. No rigidity.      Comments:     Skin:     General: Skin is warm and dry.   Neurological:      General: No focal deficit present.      Mental Status: She is alert and oriented to person, place, and time.   Psychiatric:         Mood and Affect: Mood normal.         Behavior: Behavior normal.       Orders Only on 03/14/2025   Component Date Value Ref Range Status    CHOLESTEROL, TOTAL 03/14/2025 141  <200 mg/dL Final    HDL CHOLESTEROL 03/14/2025 62  > OR = 50 mg/dL Final    TRIGLYCERIDES 03/14/2025 148  <150 mg/dL Final    LDL-CHOLESTEROL 03/14/2025 56  mg/dL (calc) Final    Comment: Reference range: <100     Desirable range <100 mg/dL for primary prevention;    <70 mg/dL for patients with CHD or diabetic patients   with > or = 2 CHD risk factors.     LDL-C is now calculated using the Scot-Rhodes   calculation, which is a validated novel method providing   better accuracy than the Friedewald equation in the   estimation of LDL-C.   Scot CHERRY et al. CAMRYN. 2013;310(19): 3535-7890   (http://education.[employer].com/faq/XAZ404)      CHOL/HDLC RATIO 03/14/2025 2.3  <5.0 (calc) Final    NON HDL CHOLESTEROL 03/14/2025 79  <130 mg/dL (calc) Final    Comment: For patients with diabetes plus 1 major ASCVD risk   factor, treating to a non-HDL-C goal of <100 mg/dL   (LDL-C of <70 mg/dL) is considered a therapeutic   option.       GLUCOSE 03/14/2025 94  65 - 99 mg/dL Final    Comment:               Fasting reference interval         UREA NITROGEN (BUN) 03/14/2025 14  7 - 25 mg/dL Final    CREATININE 03/14/2025 0.76  0.50 - 0.99 mg/dL Final    EGFR 03/14/2025 100  > OR = 60 mL/min/1.73m2 Final    SODIUM 03/14/2025 139  135 - 146 mmol/L Final    POTASSIUM 03/14/2025 4.4  3.5 - 5.3 mmol/L Final    CHLORIDE 03/14/2025 105  98 - 110 mmol/L Final    CARBON DIOXIDE 03/14/2025 28  20 - 32 mmol/L Final    ELECTROLYTE BALANCE 03/14/2025 6 (L)  7 - 17 mmol/L (calc) Final    CALCIUM 03/14/2025 8.6  8.6 - 10.2 mg/dL Final    PROTEIN, TOTAL 03/14/2025 6.3  6.1 - 8.1 g/dL Final    ALBUMIN 03/14/2025 3.7  3.6 - 5.1 g/dL Final    BILIRUBIN, TOTAL 03/14/2025 0.4  0.2 - 1.2 mg/dL Final    ALKALINE PHOSPHATASE 03/14/2025 85  31 - 125 U/L Final    AST 03/14/2025 15  10 - 30 U/L Final    ALT 03/14/2025 20  6 - 29 U/L Final    WHITE BLOOD CELL COUNT 03/14/2025 6.8  3.8 - 10.8 Thousand/uL Final    RED BLOOD CELL COUNT 03/14/2025 4.48  3.80 - 5.10 Million/uL Final    HEMOGLOBIN 03/14/2025 12.6  11.7 - 15.5 g/dL Final    HEMATOCRIT 03/14/2025 39.2  35.0 - 45.0 % Final    MCV 03/14/2025 87.5  80.0 - 100.0 fL Final    MCH 03/14/2025 28.1  27.0 - 33.0 pg Final    MCHC 03/14/2025 32.1  32.0 - 36.0 g/dL Final    Comment: For adults, a slight decrease in the calculated MCHC  value (in the range of 30 to 32 g/dL) is most likely  not clinically significant; however, it should be  interpreted with caution in correlation with other  red cell parameters and the patient's clinical  condition.      RDW 03/14/2025 15.4 (H)  11.0 - 15.0 % Final    PLATELET COUNT 03/14/2025 284  140 - 400 Thousand/uL Final    MPV 03/14/2025 9.3  7.5 - 12.5 fL Final    ABSOLUTE NEUTROPHILS 03/14/2025 3,917  1,500 - 7,800 cells/uL Final    ABSOLUTE LYMPHOCYTES 03/14/2025 2,196  850 - 3,900 cells/uL Final    ABSOLUTE MONOCYTES 03/14/2025 517  200 - 950 cells/uL Final    ABSOLUTE EOSINOPHILS 03/14/2025  129  15 - 500 cells/uL Final    ABSOLUTE BASOPHILS 03/14/2025 41  0 - 200 cells/uL Final    NEUTROPHILS 03/14/2025 57.6  % Final    LYMPHOCYTES 03/14/2025 32.3  % Final    MONOCYTES 03/14/2025 7.6  % Final    EOSINOPHILS 03/14/2025 1.9  % Final    BASOPHILS 03/14/2025 0.6  % Final    VITAMIN B12 03/14/2025 364  200 - 1,100 pg/mL Final    Comment:    Please Note: Although the reference range for vitamin  B12 is 200-1100 pg/mL, it has been reported that between  5 and 10% of patients with values between 200 and 400  pg/mL may experience neuropsychiatric and hematologic  abnormalities due to occult B12 deficiency; less than 1%  of patients with values above 400 pg/mL will have symptoms.         TSH W/REFLEX TO FT4 03/14/2025 1.96  mIU/L Final    Comment:           Reference Range                         > or = 20 Years  0.40-4.50                              Pregnancy Ranges            First trimester    0.26-2.66            Second trimester   0.55-2.73            Third trimester    0.43-2.91      HEMOGLOBIN A1c 03/14/2025 5.2  <5.7 % of total Hgb Final    Comment: For the purpose of screening for the presence of  diabetes:     <5.7%       Consistent with the absence of diabetes  5.7-6.4%    Consistent with increased risk for diabetes              (prediabetes)  > or =6.5%  Consistent with diabetes     This assay result is consistent with a decreased risk  of diabetes.     Currently, no consensus exists regarding use of  hemoglobin A1c for diagnosis of diabetes in children.     According to American Diabetes Association (ADA)  guidelines, hemoglobin A1c <7.0% represents optimal  control in non-pregnant diabetic patients. Different  metrics may apply to specific patient populations.   Standards of Medical Care in Diabetes(ADA).          eAG (mg/dL) 03/14/2025 103  mg/dL Final    eAG (mmol/L) 03/14/2025 5.7  mmol/L Final        Assessment/Plan   Problem List Items Addressed This Visit       Other hyperlipidemia     Elevated glucose    Hypertension     Other Visit Diagnoses       Screening mammogram for breast cancer    -  Primary    Relevant Orders    BI mammo bilateral screening tomosynthesis             Routine follow up six months, no labs.  Call concerns.      Anabelle Mcgrath MD

## 2025-03-26 DIAGNOSIS — R45.89 FEELING SAD: ICD-10-CM

## 2025-03-26 DIAGNOSIS — E78.49 OTHER HYPERLIPIDEMIA: ICD-10-CM

## 2025-03-26 DIAGNOSIS — I10 PRIMARY HYPERTENSION: ICD-10-CM

## 2025-03-26 DIAGNOSIS — R53.83 OTHER FATIGUE: ICD-10-CM

## 2025-03-26 RX ORDER — ESCITALOPRAM OXALATE 10 MG/1
10 TABLET ORAL DAILY
Qty: 90 TABLET | Refills: 3 | Status: SHIPPED | OUTPATIENT
Start: 2025-03-26

## 2025-03-26 RX ORDER — LOSARTAN POTASSIUM 100 MG/1
100 TABLET ORAL DAILY
Qty: 90 TABLET | Refills: 3 | Status: SHIPPED | OUTPATIENT
Start: 2025-03-26

## 2025-03-26 RX ORDER — ATORVASTATIN CALCIUM 40 MG/1
40 TABLET, FILM COATED ORAL NIGHTLY
Qty: 90 TABLET | Refills: 3 | Status: SHIPPED | OUTPATIENT
Start: 2025-03-26

## 2025-03-28 ENCOUNTER — HOSPITAL ENCOUNTER (OUTPATIENT)
Dept: RADIOLOGY | Facility: HOSPITAL | Age: 45
Discharge: HOME | End: 2025-03-28
Payer: COMMERCIAL

## 2025-03-28 DIAGNOSIS — Z12.31 SCREENING MAMMOGRAM FOR BREAST CANCER: ICD-10-CM

## 2025-03-28 PROCEDURE — 77067 SCR MAMMO BI INCL CAD: CPT | Performed by: RADIOLOGY

## 2025-03-28 PROCEDURE — 77067 SCR MAMMO BI INCL CAD: CPT

## 2025-03-28 PROCEDURE — 77063 BREAST TOMOSYNTHESIS BI: CPT | Performed by: RADIOLOGY

## 2025-04-10 ENCOUNTER — APPOINTMENT (OUTPATIENT)
Dept: VASCULAR MEDICINE | Facility: HOSPITAL | Age: 45
End: 2025-04-10
Payer: COMMERCIAL

## 2025-04-10 ENCOUNTER — HOSPITAL ENCOUNTER (EMERGENCY)
Facility: HOSPITAL | Age: 45
Discharge: HOME | End: 2025-04-10
Attending: EMERGENCY MEDICINE
Payer: COMMERCIAL

## 2025-04-10 ENCOUNTER — APPOINTMENT (OUTPATIENT)
Dept: RADIOLOGY | Facility: HOSPITAL | Age: 45
End: 2025-04-10
Payer: COMMERCIAL

## 2025-04-10 ENCOUNTER — APPOINTMENT (OUTPATIENT)
Dept: CARDIOLOGY | Facility: HOSPITAL | Age: 45
End: 2025-04-10
Payer: COMMERCIAL

## 2025-04-10 VITALS
RESPIRATION RATE: 18 BRPM | DIASTOLIC BLOOD PRESSURE: 66 MMHG | OXYGEN SATURATION: 98 % | BODY MASS INDEX: 42.89 KG/M2 | WEIGHT: 283 LBS | HEIGHT: 68 IN | HEART RATE: 70 BPM | SYSTOLIC BLOOD PRESSURE: 144 MMHG

## 2025-04-10 DIAGNOSIS — I10 HYPERTENSION, UNSPECIFIED TYPE: ICD-10-CM

## 2025-04-10 DIAGNOSIS — R00.2 HEART PALPITATIONS: Primary | ICD-10-CM

## 2025-04-10 DIAGNOSIS — M79.662 PAIN IN LEFT LOWER LEG: ICD-10-CM

## 2025-04-10 LAB
ALBUMIN SERPL BCP-MCNC: 3.6 G/DL (ref 3.4–5)
ALP SERPL-CCNC: 85 U/L (ref 33–110)
ALT SERPL W P-5'-P-CCNC: 17 U/L (ref 7–45)
ANION GAP SERPL CALC-SCNC: 12 MMOL/L (ref 10–20)
APPEARANCE UR: CLEAR
APTT PPP: 28 SECONDS (ref 26–36)
AST SERPL W P-5'-P-CCNC: 15 U/L (ref 9–39)
BASOPHILS # BLD AUTO: 0.04 X10*3/UL (ref 0–0.1)
BASOPHILS NFR BLD AUTO: 0.5 %
BILIRUB SERPL-MCNC: 0.4 MG/DL (ref 0–1.2)
BILIRUB UR STRIP.AUTO-MCNC: NEGATIVE MG/DL
BNP SERPL-MCNC: 50 PG/ML (ref 0–99)
BODY SURFACE AREA: 2.48 M2
BUN SERPL-MCNC: 17 MG/DL (ref 6–23)
CALCIUM SERPL-MCNC: 8.6 MG/DL (ref 8.6–10.3)
CARDIAC TROPONIN I PNL SERPL HS: 3 NG/L (ref 0–13)
CARDIAC TROPONIN I PNL SERPL HS: 4 NG/L (ref 0–13)
CHLORIDE SERPL-SCNC: 107 MMOL/L (ref 98–107)
CO2 SERPL-SCNC: 24 MMOL/L (ref 21–32)
COLOR UR: COLORLESS
CREAT SERPL-MCNC: 0.8 MG/DL (ref 0.5–1.05)
D DIMER PPP FEU-MCNC: 331 NG/ML FEU
EGFRCR SERPLBLD CKD-EPI 2021: >90 ML/MIN/1.73M*2
EOSINOPHIL # BLD AUTO: 0.16 X10*3/UL (ref 0–0.7)
EOSINOPHIL NFR BLD AUTO: 1.9 %
ERYTHROCYTE [DISTWIDTH] IN BLOOD BY AUTOMATED COUNT: 14.6 % (ref 11.5–14.5)
FLUAV RNA RESP QL NAA+PROBE: NOT DETECTED
FLUBV RNA RESP QL NAA+PROBE: NOT DETECTED
GLUCOSE SERPL-MCNC: 86 MG/DL (ref 74–99)
GLUCOSE UR STRIP.AUTO-MCNC: NORMAL MG/DL
HCG UR QL IA.RAPID: NEGATIVE
HCT VFR BLD AUTO: 37.5 % (ref 36–46)
HGB BLD-MCNC: 12 G/DL (ref 12–16)
HOLD SPECIMEN: NORMAL
IMM GRANULOCYTES # BLD AUTO: 0.02 X10*3/UL (ref 0–0.7)
IMM GRANULOCYTES NFR BLD AUTO: 0.2 % (ref 0–0.9)
INR PPP: 0.9 (ref 0.9–1.1)
KETONES UR STRIP.AUTO-MCNC: NEGATIVE MG/DL
LEUKOCYTE ESTERASE UR QL STRIP.AUTO: NEGATIVE
LYMPHOCYTES # BLD AUTO: 2.06 X10*3/UL (ref 1.2–4.8)
LYMPHOCYTES NFR BLD AUTO: 24 %
MAGNESIUM SERPL-MCNC: 2.03 MG/DL (ref 1.6–2.4)
MCH RBC QN AUTO: 27.7 PG (ref 26–34)
MCHC RBC AUTO-ENTMCNC: 32 G/DL (ref 32–36)
MCV RBC AUTO: 87 FL (ref 80–100)
MONOCYTES # BLD AUTO: 0.71 X10*3/UL (ref 0.1–1)
MONOCYTES NFR BLD AUTO: 8.3 %
NEUTROPHILS # BLD AUTO: 5.6 X10*3/UL (ref 1.2–7.7)
NEUTROPHILS NFR BLD AUTO: 65.1 %
NITRITE UR QL STRIP.AUTO: NEGATIVE
NRBC BLD-RTO: 0 /100 WBCS (ref 0–0)
PH UR STRIP.AUTO: 7 [PH]
PLATELET # BLD AUTO: 269 X10*3/UL (ref 150–450)
POTASSIUM SERPL-SCNC: 3.8 MMOL/L (ref 3.5–5.3)
PROT SERPL-MCNC: 6.5 G/DL (ref 6.4–8.2)
PROT UR STRIP.AUTO-MCNC: NEGATIVE MG/DL
PROTHROMBIN TIME: 9.7 SECONDS (ref 9.8–12.4)
RBC # BLD AUTO: 4.33 X10*6/UL (ref 4–5.2)
RBC # UR STRIP.AUTO: ABNORMAL MG/DL
RBC #/AREA URNS AUTO: NORMAL /HPF
RSV RNA RESP QL NAA+PROBE: NOT DETECTED
SARS-COV-2 RNA RESP QL NAA+PROBE: NOT DETECTED
SODIUM SERPL-SCNC: 139 MMOL/L (ref 136–145)
SP GR UR STRIP.AUTO: 1
SQUAMOUS #/AREA URNS AUTO: NORMAL /HPF
UROBILINOGEN UR STRIP.AUTO-MCNC: NORMAL MG/DL
WBC # BLD AUTO: 8.6 X10*3/UL (ref 4.4–11.3)
WBC #/AREA URNS AUTO: NORMAL /HPF

## 2025-04-10 PROCEDURE — 36415 COLL VENOUS BLD VENIPUNCTURE: CPT | Performed by: PHYSICIAN ASSISTANT

## 2025-04-10 PROCEDURE — 83880 ASSAY OF NATRIURETIC PEPTIDE: CPT | Performed by: PHYSICIAN ASSISTANT

## 2025-04-10 PROCEDURE — 81001 URINALYSIS AUTO W/SCOPE: CPT | Performed by: PHYSICIAN ASSISTANT

## 2025-04-10 PROCEDURE — 84484 ASSAY OF TROPONIN QUANT: CPT | Performed by: PHYSICIAN ASSISTANT

## 2025-04-10 PROCEDURE — 85610 PROTHROMBIN TIME: CPT | Performed by: PHYSICIAN ASSISTANT

## 2025-04-10 PROCEDURE — 80053 COMPREHEN METABOLIC PANEL: CPT | Performed by: PHYSICIAN ASSISTANT

## 2025-04-10 PROCEDURE — 9420000001 HC RT PATIENT EDUCATION 5 MIN

## 2025-04-10 PROCEDURE — 85730 THROMBOPLASTIN TIME PARTIAL: CPT | Performed by: PHYSICIAN ASSISTANT

## 2025-04-10 PROCEDURE — 83735 ASSAY OF MAGNESIUM: CPT | Performed by: PHYSICIAN ASSISTANT

## 2025-04-10 PROCEDURE — 93971 EXTREMITY STUDY: CPT | Performed by: INTERNAL MEDICINE

## 2025-04-10 PROCEDURE — 87637 SARSCOV2&INF A&B&RSV AMP PRB: CPT | Performed by: PHYSICIAN ASSISTANT

## 2025-04-10 PROCEDURE — 71045 X-RAY EXAM CHEST 1 VIEW: CPT | Mod: FOREIGN READ | Performed by: RADIOLOGY

## 2025-04-10 PROCEDURE — 93005 ELECTROCARDIOGRAM TRACING: CPT

## 2025-04-10 PROCEDURE — 94762 N-INVAS EAR/PLS OXIMTRY CONT: CPT

## 2025-04-10 PROCEDURE — 99285 EMERGENCY DEPT VISIT HI MDM: CPT | Mod: 25 | Performed by: EMERGENCY MEDICINE

## 2025-04-10 PROCEDURE — 71045 X-RAY EXAM CHEST 1 VIEW: CPT

## 2025-04-10 PROCEDURE — 85025 COMPLETE CBC W/AUTO DIFF WBC: CPT | Performed by: PHYSICIAN ASSISTANT

## 2025-04-10 PROCEDURE — 93971 EXTREMITY STUDY: CPT

## 2025-04-10 PROCEDURE — 85379 FIBRIN DEGRADATION QUANT: CPT | Performed by: PHYSICIAN ASSISTANT

## 2025-04-10 PROCEDURE — 81025 URINE PREGNANCY TEST: CPT | Performed by: PHYSICIAN ASSISTANT

## 2025-04-10 PROCEDURE — 93242 EXT ECG>48HR<7D RECORDING: CPT

## 2025-04-10 RX ORDER — ASPIRIN 325 MG
650 TABLET ORAL AS NEEDED
COMMUNITY

## 2025-04-10 ASSESSMENT — HEART SCORE
HISTORY: SLIGHTLY SUSPICIOUS
AGE: <45
ECG: NORMAL
RISK FACTORS: 1-2 RISK FACTORS
TROPONIN: LESS THAN OR EQUAL TO NORMAL LIMIT
HEART SCORE: 1

## 2025-04-10 ASSESSMENT — ENCOUNTER SYMPTOMS
FEVER: 0
VOMITING: 0
BACK PAIN: 0
DIZZINESS: 1
ARTHRALGIAS: 0
FATIGUE: 1
ABDOMINAL PAIN: 0
SEIZURES: 0
HEMATURIA: 0
EYE PAIN: 0
DYSURIA: 0
CHILLS: 0
SORE THROAT: 0
PALPITATIONS: 1
COLOR CHANGE: 0
COUGH: 0
SHORTNESS OF BREATH: 0
MYALGIAS: 1

## 2025-04-10 ASSESSMENT — PAIN DESCRIPTION - DESCRIPTORS: DESCRIPTORS: ACHING

## 2025-04-10 ASSESSMENT — COLUMBIA-SUICIDE SEVERITY RATING SCALE - C-SSRS
1. IN THE PAST MONTH, HAVE YOU WISHED YOU WERE DEAD OR WISHED YOU COULD GO TO SLEEP AND NOT WAKE UP?: NO
2. HAVE YOU ACTUALLY HAD ANY THOUGHTS OF KILLING YOURSELF?: NO
6. HAVE YOU EVER DONE ANYTHING, STARTED TO DO ANYTHING, OR PREPARED TO DO ANYTHING TO END YOUR LIFE?: NO

## 2025-04-10 ASSESSMENT — PAIN - FUNCTIONAL ASSESSMENT: PAIN_FUNCTIONAL_ASSESSMENT: 0-10

## 2025-04-10 ASSESSMENT — PAIN SCALES - GENERAL: PAINLEVEL_OUTOF10: 5 - MODERATE PAIN

## 2025-04-10 ASSESSMENT — PAIN DESCRIPTION - LOCATION: LOCATION: HEAD

## 2025-04-10 NOTE — ED PROVIDER NOTES
"Patient is a 44-year-old female who presents to the emergency room with multiple complaints.  She states that she \"does not feel right.\"  She states that she feels fatigued, has had heart palpitations, dizziness, headache and generalized bodyaches.  She states that symptoms have been ongoing for approximately 1 week, worse over the past few days.  She states that her heart has been fluttering and when this happens it lasts approximately 3 to 5 minutes.  She states that her heart rate on her watch is showing 104 bpm at the highest.  She also reports that her blood pressure has been elevated.  She is on blood pressure medication and has been taking as prescribed.  In addition she also reports left anterior thigh pain, states that over the past 2 days has been painful.  She denies any injury.  No recent long trips or travels, surgery, history of DVT or PE.  No redness or warmth.           Review of Systems   Constitutional:  Positive for fatigue. Negative for chills and fever.   HENT:  Negative for ear pain and sore throat.    Eyes:  Negative for pain and visual disturbance.   Respiratory:  Negative for cough and shortness of breath.    Cardiovascular:  Positive for palpitations. Negative for chest pain.   Gastrointestinal:  Negative for abdominal pain and vomiting.   Genitourinary:  Negative for dysuria and hematuria.   Musculoskeletal:  Positive for myalgias. Negative for arthralgias and back pain.   Skin:  Negative for color change and rash.   Neurological:  Positive for dizziness. Negative for seizures and syncope.   All other systems reviewed and are negative.       Physical Exam  Vitals and nursing note reviewed.   Constitutional:       General: She is not in acute distress.     Appearance: Normal appearance. She is well-developed.   HENT:      Head: Normocephalic and atraumatic.      Nose: Nose normal. No congestion.      Mouth/Throat:      Pharynx: No oropharyngeal exudate or posterior oropharyngeal erythema. "   Eyes:      Extraocular Movements: Extraocular movements intact.      Conjunctiva/sclera: Conjunctivae normal.   Cardiovascular:      Rate and Rhythm: Normal rate and regular rhythm.      Pulses: Normal pulses.      Heart sounds: No murmur heard.  Pulmonary:      Effort: Pulmonary effort is normal. No respiratory distress.      Breath sounds: Normal breath sounds. No stridor. No wheezing, rhonchi or rales.   Abdominal:      General: There is no distension.      Palpations: Abdomen is soft. There is no mass.      Tenderness: There is no abdominal tenderness. There is no guarding or rebound.      Hernia: No hernia is present.   Musculoskeletal:         General: No swelling, tenderness, deformity or signs of injury.      Cervical back: Normal range of motion and neck supple. No rigidity.   Skin:     General: Skin is warm and dry.      Capillary Refill: Capillary refill takes less than 2 seconds.   Neurological:      General: No focal deficit present.      Mental Status: She is alert and oriented to person, place, and time.      Cranial Nerves: No cranial nerve deficit.      Sensory: No sensory deficit.      Motor: No weakness.      Coordination: Coordination normal.      Gait: Gait normal.      Comments: NIH is 0   Psychiatric:         Mood and Affect: Mood normal.          Labs Reviewed   CBC WITH AUTO DIFFERENTIAL - Abnormal       Result Value    WBC 8.6      nRBC 0.0      RBC 4.33      Hemoglobin 12.0      Hematocrit 37.5      MCV 87      MCH 27.7      MCHC 32.0      RDW 14.6 (*)     Platelets 269      Neutrophils % 65.1      Immature Granulocytes %, Automated 0.2      Lymphocytes % 24.0      Monocytes % 8.3      Eosinophils % 1.9      Basophils % 0.5      Neutrophils Absolute 5.60      Immature Granulocytes Absolute, Automated 0.02      Lymphocytes Absolute 2.06      Monocytes Absolute 0.71      Eosinophils Absolute 0.16      Basophils Absolute 0.04     PROTIME-INR - Abnormal    Protime 9.7 (*)     INR 0.9      URINALYSIS WITH REFLEX CULTURE AND MICROSCOPIC - Abnormal    Color, Urine Colorless (*)     Appearance, Urine Clear      Specific Gravity, Urine 1.005      pH, Urine 7.0      Protein, Urine NEGATIVE      Glucose, Urine Normal      Blood, Urine 1.0 (3+) (*)     Ketones, Urine NEGATIVE      Bilirubin, Urine NEGATIVE      Urobilinogen, Urine Normal      Nitrite, Urine NEGATIVE      Leukocyte Esterase, Urine NEGATIVE     COMPREHENSIVE METABOLIC PANEL - Normal    Glucose 86      Sodium 139      Potassium 3.8      Chloride 107      Bicarbonate 24      Anion Gap 12      Urea Nitrogen 17      Creatinine 0.80      eGFR >90      Calcium 8.6      Albumin 3.6      Alkaline Phosphatase 85      Total Protein 6.5      AST 15      Bilirubin, Total 0.4      ALT 17     MAGNESIUM - Normal    Magnesium 2.03     APTT - Normal    aPTT 28      Narrative:     The APTT is no longer used for monitoring Unfractionated Heparin Therapy. For monitoring Heparin Therapy, use the Heparin Assay.   HCG, URINE, QUALITATIVE - Normal    HCG, Urine NEGATIVE     D-DIMER, VTE EXCLUSION - Normal    D-Dimer, Quantitative VTE Exclusion 331      Narrative:     The VTE Exclusion D-Dimer assay is reported in ng/mL Fibrinogen Equivalent Units (FEU).    Per 's instructions for use, a value of less than 500 ng/mL (FEU) may help to exclude DVT or PE in outpatients when the assay is used with a clinical pretest probability assessment.(AE must utilize and document eCalc 'Wells Score Deep Vein Thrombosis Risk' for DVT exclusion only. Emergency Department should utilize  Guidelines for Emergency Department Use of the VTE Exclusion D-Dimer and Clinical Pretest probability assessment model for DVT or PE exclusion.)   B-TYPE NATRIURETIC PEPTIDE - Normal    BNP 50      Narrative:        <100 pg/mL - Heart failure unlikely  100-299 pg/mL - Intermediate probability of acute heart                  failure exacerbation. Correlate with clinical                   context and patient history.    >=300 pg/mL - Heart Failure likely. Correlate with clinical                  context and patient history.    BNP testing is performed using different testing methodology at [facility] than at other [facility]. Direct result comparisons should only be made within the same method.      SARS-COV-2, INFLUENZA A/B AND RSV PCR - Normal    Coronavirus 2019, PCR Not Detected      Flu A Result Not Detected      Flu B Result Not Detected      RSV PCR Not Detected      Narrative:     This assay is an FDA-cleared, in vitro diagnostic nucleic acid amplification test for the qualitative detection and differentiation of SARS CoV-2/ Influenza A/B/ RSV from nasopharyngeal specimens collected from individuals with signs and symptoms of respiratory tract infections, and has been validated for use at [facility]. Negative results do not preclude COVID-19/ Influenza A/B/ RSV infections and should not be used as the sole basis for diagnosis, treatment, or other management decisions. Testing for SARS CoV-2 is recommended only for patients who meet current clinical and/or epidemiological criteria defined by federal, state, or local public health directives.   SERIAL TROPONIN-INITIAL - Normal    Troponin I, High Sensitivity 3      Narrative:     Less than 99th percentile of normal range cutoff-  Female and children under 18 years old <14 ng/L; Male <21 ng/L: Negative  Repeat testing should be performed if clinically indicated.     Female and children under 18 years old 14-50 ng/L; Male 21-50 ng/L:  Consistent with possible cardiac damage and possible increased clinical   risk. Serial measurements may help to assess extent of myocardial damage.     >50 ng/L: Consistent with cardiac damage, increased clinical risk and  myocardial infarction. Serial measurements may help assess extent of   myocardial damage.      NOTE: Children less than 1 year old may have higher  baseline troponin   levels and results should be interpreted in conjunction with the overall   clinical context.     NOTE: Troponin I testing is performed using a different   testing methodology at [facility] than at other   [facility]. Direct result comparisons should only   be made within the same method.   SERIAL TROPONIN, 1 HOUR - Normal    Troponin I, High Sensitivity 4      Narrative:     Less than 99th percentile of normal range cutoff-  Female and children under 18 years old <14 ng/L; Male <21 ng/L: Negative  Repeat testing should be performed if clinically indicated.     Female and children under 18 years old 14-50 ng/L; Male 21-50 ng/L:  Consistent with possible cardiac damage and possible increased clinical   risk. Serial measurements may help to assess extent of myocardial damage.     >50 ng/L: Consistent with cardiac damage, increased clinical risk and  myocardial infarction. Serial measurements may help assess extent of   myocardial damage.      NOTE: Children less than 1 year old may have higher baseline troponin   levels and results should be interpreted in conjunction with the overall   clinical context.     NOTE: Troponin I testing is performed using a different   testing methodology at [facility] than at other   [facility]. Direct result comparisons should only   be made within the same method.   TROPONIN SERIES- (INITIAL, 1 HR)    Narrative:     The following orders were created for panel order Troponin I Series, High Sensitivity (0, 1 HR).  Procedure                               Abnormality         Status                     ---------                               -----------         ------                     Troponin I, High Sensiti...[[id number]]  Normal              Final result               Troponin, High Sensitivi...[[id number]]  Normal              Final result                 Please view results for these tests on the individual orders.    URINALYSIS WITH REFLEX CULTURE AND MICROSCOPIC    Narrative:     The following orders were created for panel order Urinalysis with Reflex Culture and Microscopic.  Procedure                               Abnormality         Status                     ---------                               -----------         ------                     Urinalysis with Reflex C...[[id number]]  Abnormal            Final result               Extra Urine Gray Tube[[id number]]                            In process                   Please view results for these tests on the individual orders.   EXTRA URINE GRAY TUBE   URINALYSIS MICROSCOPIC WITH REFLEX CULTURE    WBC, Urine 1-5      RBC, Urine 3-5      Squamous Epithelial Cells, Urine 1-9 (SPARSE)          Lower extremity venous duplex left         XR chest 1 view   Final Result   No radiographic evidence of acute cardiopulmonary disease.   Signed by Grey Jacques MD      Holter Or Event Cardiac Monitor    (Results Pending)        ECG 12 Lead    Performed by: Bibiana Penny PA-C  Authorized by: Bibiana Penny PA-C    ECG interpreted by ED Physician in the absence of a cardiologist: yes    Comments:      EKG shows normal sinus rhythm with a rate of 76 bpm.  No ST elevation.  DE interval is 144 ms and QRS duration is 84 ms.  EKG interpretation per myself, Bibiana Penny       Medical Decision Making  Patient is a 44-year-old female who presents to the emergency room with multiple complaints including heart palpitations/heart fluttering, dizziness, elevated blood pressure, headache and left leg pain.  EKG is unremarkable.  Patient was placed on the cardiac monitor and no abnormalities noted during her emergency room stay.  Her blood pressure was elevated upon arrival initially 173/78.  It improved throughout her course without any medication and the last blood pressure taken was 128/73.  She is afebrile and nontoxic-appearing.  COVID, influenza, and RSV are negative.  Chest  x-ray shows no acute process.  D-dimer is within normal limits.  Initial and repeat troponin are normal.  Lab work is otherwise unremarkable.  Patient does report that she is feeling better.  She states that in August 2024 she was having similar symptoms and was placed on an event monitor for approximately 30 days and had seen cardiology.  Given the repeat events, event monitor will be placed here in the emergency department and patient will be referred to cardiology.  Instructed to return for any new or worsening symptoms and follow-up with cardiology and her PCP as instructed.  Patient verbalizes understanding and feels comfortable with this plan of discharge.    DDX includes but not limited to: heart palpitations, electrolyte abnormality, anxiety, Pulmonary Embolism, Viral illness, ACS, Atrial fibrillation    NIH:0  HEART SCORE: 1    Amount and/or Complexity of Data Reviewed  Labs: ordered. Decision-making details documented in ED Course.  Radiology: ordered and independent interpretation performed. Decision-making details documented in ED Course.     Details: 1 view chest x-ray per my interpretation shows no infiltrate or effusion  ECG/medicine tests: ordered and independent interpretation performed. Decision-making details documented in ED Course.         Diagnoses as of 04/10/25 1236   Heart palpitations   Hypertension, unspecified type                    Bibiana Penny PA-C  04/10/25 1236

## 2025-04-11 LAB
ATRIAL RATE: 76 BPM
P AXIS: 43 DEGREES
P OFFSET: 193 MS
P ONSET: 144 MS
PR INTERVAL: 144 MS
Q ONSET: 216 MS
QRS COUNT: 13 BEATS
QRS DURATION: 84 MS
QT INTERVAL: 386 MS
QTC CALCULATION(BAZETT): 434 MS
QTC FREDERICIA: 417 MS
R AXIS: 24 DEGREES
T AXIS: 44 DEGREES
T OFFSET: 409 MS
VENTRICULAR RATE: 76 BPM

## 2025-04-23 LAB — BODY SURFACE AREA: 2.48 M2

## 2025-04-25 ENCOUNTER — APPOINTMENT (OUTPATIENT)
Dept: CARDIOLOGY | Facility: CLINIC | Age: 45
End: 2025-04-25
Payer: COMMERCIAL

## 2025-05-15 ENCOUNTER — HOSPITAL ENCOUNTER (OUTPATIENT)
Dept: HOSPITAL 100 - MTLAB | Age: 45
Discharge: HOME | End: 2025-05-15
Payer: COMMERCIAL

## 2025-05-15 DIAGNOSIS — M06.4: Primary | ICD-10-CM

## 2025-05-15 DIAGNOSIS — Z79.899: ICD-10-CM

## 2025-05-15 DIAGNOSIS — R76.8: ICD-10-CM

## 2025-05-15 DIAGNOSIS — M17.12: ICD-10-CM

## 2025-05-15 LAB
ALBUMIN SERPL-MCNC: 3.7 G/DL (ref 3.5–5)
ALP SERPL-CCNC: 97 U/L (ref 35–104)
ALT SERPL-CCNC: 23 U/L (ref ?–34)
ANION GAP SERPL CALC-SCNC: 12 MMOL/L (ref 5–15)
AST(SGOT): 23 U/L (ref ?–31)
BUN SERPL-MCNC: 12 MG/DL (ref 4–19)
BUN/CREAT SERPL: 14.5 RATIO (ref 10–20)
CALCIUM SERPL-MCNC: 8.9 MG/DL (ref 7.6–11)
CHLORIDE: 105 MMOL/L (ref 98–108)
CO2 BLDCV-SCNC: 21.4 MMOL/L (ref 21–32)
DEPRECATED RDW RBC: 47.9 FL (ref 35.1–43.9)
ERYTHROCYTE [DISTWIDTH] IN BLOOD: 14.8 % (ref 11.6–14.6)
GLUCOSE SERPL-MCNC: 96 MG/DL (ref 70–99)
HCT VFR BLD AUTO: 35.4 % (ref 37–47)
HGB BLD-MCNC: 11.5 G/DL (ref 12–15)
MCV RBC: 89.2 FL (ref 81–99)
MEAN CORP HGB CONC: 32.5 G/DL (ref 32–36)
MEAN PLATELET VOL.: 9.2 FL (ref 6.2–12)
NRBC FLAGGED BY ANALYZER: 0 % (ref 0–5)
PLATELET # BLD: 294 K/MM3 (ref 150–450)
POTASSIUM SPEC-MCNC: 4.1 MMOL/L (ref 3.3–5.1)
RBC # BLD AUTO: 3.97 M/MM3 (ref 4.2–5.4)

## 2025-05-15 PROCEDURE — 80053 COMPREHEN METABOLIC PANEL: CPT

## 2025-05-15 PROCEDURE — 36415 COLL VENOUS BLD VENIPUNCTURE: CPT

## 2025-05-15 PROCEDURE — 85025 COMPLETE CBC W/AUTO DIFF WBC: CPT

## 2025-05-19 LAB — BODY SURFACE AREA: 2.48 M2

## 2025-05-19 PROCEDURE — 93244 EXT ECG>48HR<7D REV&INTERPJ: CPT | Performed by: STUDENT IN AN ORGANIZED HEALTH CARE EDUCATION/TRAINING PROGRAM

## 2025-07-18 DIAGNOSIS — I10 PRIMARY HYPERTENSION: ICD-10-CM

## 2025-07-18 RX ORDER — LOSARTAN POTASSIUM 100 MG/1
100 TABLET ORAL DAILY
Qty: 30 TABLET | Refills: 0 | Status: SHIPPED | OUTPATIENT
Start: 2025-07-18

## 2025-09-15 ENCOUNTER — APPOINTMENT (OUTPATIENT)
Age: 45
End: 2025-09-15
Payer: COMMERCIAL